# Patient Record
Sex: MALE | Race: WHITE | Employment: OTHER | URBAN - METROPOLITAN AREA
[De-identification: names, ages, dates, MRNs, and addresses within clinical notes are randomized per-mention and may not be internally consistent; named-entity substitution may affect disease eponyms.]

---

## 2017-02-01 ENCOUNTER — THERAPY VISIT (OUTPATIENT)
Dept: PHYSICAL THERAPY | Facility: CLINIC | Age: 62
End: 2017-02-01
Payer: COMMERCIAL

## 2017-02-01 DIAGNOSIS — Z47.89 AFTERCARE FOLLOWING SURGERY OF THE MUSCULOSKELETAL SYSTEM: ICD-10-CM

## 2017-02-01 DIAGNOSIS — M79.671 RIGHT FOOT PAIN: Primary | ICD-10-CM

## 2017-02-01 PROCEDURE — 97161 PT EVAL LOW COMPLEX 20 MIN: CPT | Mod: GP | Performed by: PHYSICAL THERAPIST

## 2017-02-01 PROCEDURE — 97110 THERAPEUTIC EXERCISES: CPT | Mod: GP | Performed by: PHYSICAL THERAPIST

## 2017-02-01 NOTE — PROGRESS NOTES
[facility] Initial Evaluation    Subjective:    Charan Pretty is a 61 year old male with a right foot condition.  Occurance: degeneration.  Condition occurred: for unknown reasons.  This is a new condition  Charan reports that he had a long history of plantar fasciitis and had undergone perforation surgery which did not resolve pain.  Had MRI which revealed frayed tendon to FHL and excessive calcaneal tuberosity.  Pt underwent surgery about 2 months ago for Taylor's excision and FHL transfer.  At this time, he is wearing a CAM boot and is supposed to be NWB until last week, otherwise ambulating with scooter.  Has taken a few steps at home without scooter but this increases pain in back of heel.  Pt works as a  at [employer], currently not working d/t present treatment problem.  .    Site of Pain: back of heel, bottom of heel.  Radiates to:  No radiation.  Pain is described as sharp and is intermittent and reported as 7/10.  Associated symptoms:  Tingling, numbness, loss of motion/stiffness, edema and loss of strength. Worse during: activity-dependent.  Symptoms are exacerbated by activity, ascending stairs, descending stairs, bending/squatting, standing, walking, certain positions and weight bearing and relieved by rest.  Since onset symptoms are unchanged.        General health as reported by patient is fair.  Pertinent medical history includes:  History of fractures, overweight and high blood pressure.  Medical allergies: no.  Other surgeries include:  None reported.  Current medications:  High blood pressure medication.  Current occupation is .  Patient is currently not working due to present treatment problem.  Primary job tasks include:  Prolonged standing and lifting (walking, various physical tasks, pushing/pulling).    Barriers include:  Stairs.    Red flags:  None as reported by patient.                      Objective:      Gait:  Pt using roll-about knee  scooter  Gait Type:  Not assessed         Flexibility/Screens:       Lower Extremity:      Decreased right lower extremity flexibility:  Gastroc and Soleus          Ankle/Foot Evaluation  ROM:  Arom ankle eval: strength not assessed d/t post-op status, tendon transfers and lack of restrictions specified in order.  AROM:    Dorsiflexion:  Left:   6  Right:   6  Plantarflexion:  Left:  60    Right:  34  Inversion:  Left:  30     Right:  18  Eversion:  12     Right:  2          LIGAMENT TESTING: not assessed              SPECIAL TESTS: not assessed    PALPATION:     Right ankle tenderness present at:   incisional  EDEMA:   Left ankle edema present at: 58.0 cm  Right ankle edema present at:  general and 62.0 cm      Figure 8 left: 58.0 cmFigure 8 right: 62.0 cm  MOBILITY TESTING:     Tib-Fib Distal Right: hypomobile  Talocrural Right: hypomobile  Subtalar Right: hypomobile  Midtarsal Right: hypomobile  First Ray Right: hypomobile  FUNCTIONAL TESTS: not assessed                                                                General     ROS    Assessment/Plan:      Patient is a 61 year old male with right foot complaints.    Patient has the following significant findings with corresponding treatment plan.                Diagnosis 1:  R foot pain, gait abnormality s/p Taylor's excision, FHL tendon transfer  Pain -  hot/cold therapy, manual therapy, splint/taping/bracing/orthotics, self management, education, directional preference exercise and home program  Decreased ROM/flexibility - manual therapy, therapeutic exercise and home program  Decreased joint mobility - manual therapy, therapeutic exercise and home program  Decreased strength - therapeutic exercise, therapeutic activities and home program  Impaired balance - neuro re-education, therapeutic activities and home program  Decreased proprioception - neuro re-education, therapeutic activities and home program  Inflammation - cold therapy and self management/home  program  Edema - cold therapy and self management/home program  Impaired gait - gait training and home program  Impaired muscle performance - neuro re-education and home program  Decreased function - therapeutic activities and home program    Therapy Evaluation Codes:   1) History comprised of:   Personal factors that impact the plan of care:      Profession.    Comorbidity factors that impact the plan of care are:      None.     Medications impacting care: None.  2) Examination of Body Systems comprised of:   Body structures and functions that impact the plan of care:      Foot.   Activity limitations that impact the plan of care are:      Squatting/kneeling, Stairs and Walking.  3) Clinical presentation characteristics are:   Stable/Uncomplicated.  4) Decision-Making    Low complexity using standardized patient assessment instrument and/or measureable assessment of functional outcome.  Cumulative Therapy Evaluation is: Low complexity.    Previous and current functional limitations:  (See Goal Flow Sheet for this information)    Short term and Long term goals: (See Goal Flow Sheet for this information)     Communication ability:  Patient appears to be able to clearly communicate and understand verbal and written communication and follow directions correctly.  Treatment Explanation - The following has been discussed with the patient:   RX ordered/plan of care  Anticipated outcomes  Possible risks and side effects  This patient would benefit from PT intervention to resume normal activities.   Rehab potential is good.    Frequency:  1-2 X week, once daily  Duration:  for 12 visits  Discharge Plan:  Achieve all LTG.  Independent in home treatment program.  Reach maximal therapeutic benefit.    Please refer to the daily flowsheet for treatment today, total treatment time and time spent performing 1:1 timed codes.

## 2017-02-15 ENCOUNTER — THERAPY VISIT (OUTPATIENT)
Dept: PHYSICAL THERAPY | Facility: CLINIC | Age: 62
End: 2017-02-15
Payer: COMMERCIAL

## 2017-02-15 DIAGNOSIS — Z47.89 AFTERCARE FOLLOWING SURGERY OF THE MUSCULOSKELETAL SYSTEM: ICD-10-CM

## 2017-02-15 DIAGNOSIS — M79.671 RIGHT FOOT PAIN: ICD-10-CM

## 2017-02-15 PROCEDURE — 97110 THERAPEUTIC EXERCISES: CPT | Mod: GP | Performed by: PHYSICAL THERAPIST

## 2017-02-15 PROCEDURE — 97140 MANUAL THERAPY 1/> REGIONS: CPT | Mod: GP | Performed by: PHYSICAL THERAPIST

## 2017-02-22 ENCOUNTER — THERAPY VISIT (OUTPATIENT)
Dept: PHYSICAL THERAPY | Facility: CLINIC | Age: 62
End: 2017-02-22
Payer: COMMERCIAL

## 2017-02-22 DIAGNOSIS — M79.671 RIGHT FOOT PAIN: ICD-10-CM

## 2017-02-22 DIAGNOSIS — Z47.89 AFTERCARE FOLLOWING SURGERY OF THE MUSCULOSKELETAL SYSTEM: ICD-10-CM

## 2017-02-22 PROCEDURE — 97110 THERAPEUTIC EXERCISES: CPT | Mod: GP | Performed by: PHYSICAL THERAPIST

## 2017-02-22 PROCEDURE — 97140 MANUAL THERAPY 1/> REGIONS: CPT | Mod: GP | Performed by: PHYSICAL THERAPIST

## 2017-03-01 ENCOUNTER — THERAPY VISIT (OUTPATIENT)
Dept: PHYSICAL THERAPY | Facility: CLINIC | Age: 62
End: 2017-03-01
Payer: COMMERCIAL

## 2017-03-01 DIAGNOSIS — Z47.89 AFTERCARE FOLLOWING SURGERY OF THE MUSCULOSKELETAL SYSTEM: ICD-10-CM

## 2017-03-01 DIAGNOSIS — M79.671 RIGHT FOOT PAIN: ICD-10-CM

## 2017-03-01 PROCEDURE — 97110 THERAPEUTIC EXERCISES: CPT | Mod: GP | Performed by: PHYSICAL THERAPIST

## 2017-03-01 PROCEDURE — 97140 MANUAL THERAPY 1/> REGIONS: CPT | Mod: GP | Performed by: PHYSICAL THERAPIST

## 2017-03-01 NOTE — PROGRESS NOTES
Subjective:    HPI                    Objective:    System    Physical Exam    General     ROS    Assessment/Plan:      SUBJECTIVE  Subjective changes as noted by pt:  My foot is feeling about the same but I am walking farther distances. I have worn the boot very little in the past week. HEP is going ok.     Current pain level:  Current Pain level: 3/10   Changes in function:  Yes (See Goal flowsheet attached for changes in current functional level)     Adverse reaction to treatment or activity:  None    OBJECTIVE  Changes in objective findings:  Fair/good flexibility gastroc, antalgic gait with deviations- toe out position, decreased WB on R, short stride length, decreased heel strike and push off. Reviewed toe/heel raises in sitting- progress to ankle boards, knee bends next visit.     ASSESSMENT  Charan continues to require intervention to meet STG and LTG's: PT  Patient is progressing as expected.  Patient is becoming more independent in home exercise program  Response to therapy has shown an improvement in  pain level, flexibility and gait  Progress made towards STG/LTG?  Yes (See Goal flowsheet attached for updates on achievement of STG and LTG)    PLAN  Continue current treatment plan until patient demonstrates readiness to progress to higher level exercises.    PTA/ATC plan:  N/A    Please refer to the daily flowsheet for treatment today, total treatment time and time spent performing 1:1 timed codes.

## 2017-03-01 NOTE — MR AVS SNAPSHOT
After Visit Summary   3/1/2017    Charan Pretty    MRN: [id number]           Patient Information     Date Of Birth          1955        Visit Information        Provider Department      3/1/2017 12:40 PM Yesi Akhtar, PT [facility]        Today's Diagnoses     Right foot pain        Aftercare following surgery of the musculoskeletal system           Follow-ups after your visit        Your next 10 appointments already scheduled     Mar 08, 2017 10:10 AM CST   JESSICA Extremity with Yesi Akhtar PT   [facility] ([facility])    [address]  [facility] [address]   [phone]            Mar 15, 2017 10:10 AM CDT   [facility] Extremity with Kat Dickerson PT   [facility] ([facility])    [address]  [facility] [id number] [phone]              Who to contact     If you have questions or need follow up information about today's clinic visit or your schedule please contact [facility] directly at [phone].  Normal or non-critical lab and imaging results will be communicated to you by MyChart, letter or phone within 4 business days after the clinic has received the results. If you do not hear from us within 7 days, please contact the clinic through MyChart or phone. If you have a critical or abnormal lab result, we will notify you by phone as soon as possible.  Submit refill requests through [employer]M [employer] Course or call your pharmacy and they will forward the refill request to us. Please allow 3 business days for your refill to be completed.          Additional Information About Your Visit        MyChart Information     FRM Study Course lets you send messages to your doctor, view your test results, renew your prescriptions, schedule appointments and more. To sign up, go to  [email or web address] . Click on \"Log in\" on the left side of the screen, which will take you to the Welcome page. Then click on \"Sign up Now\" on the right side of the page.     You will be asked to enter the access code listed below, as well as some personal information. Please follow the directions to create your username and password.     Your access code is: [email or web address]  Expires: 2017 12:57 PM     Your access code will  in 90 days. If you need help or a new code, please call your [place] clinic or [phone].        Care EveryWhere ID     This is your Care EveryWhere ID. This could be used by other organizations to access your [place] medical records  [id number]         Blood Pressure from Last 3 Encounters:   13 119/82   13 123/68    Weight from Last 3 Encounters:   12 102.1 kg (225 lb)              We Performed the Following     Manual Ther Tech, 1+Regions, EA 15 min     Therapeutic Exercises        Primary Care Provider Fax #    [facility] [phone] [address]  [facility] [address]        Thank you!     Thank you for choosing [facility]  for your care. Our goal is always to provide you with excellent care. Hearing back from our patients is one way we can continue to improve our services. Please take a few minutes to complete the written survey that you may receive in the mail after your visit with us. Thank you!             Your Updated Medication List - Protect others around you: Learn how to safely use, store and throw away your medicines at www.disposemymeds.org.          This list is accurate as of: 3/1/17  4:15 PM.  Always use your most recent med list.                   Brand Name Dispense Instructions for use    TOPROL XL PO      Take 50 mg by mouth daily.         "

## 2017-03-08 ENCOUNTER — THERAPY VISIT (OUTPATIENT)
Dept: PHYSICAL THERAPY | Facility: CLINIC | Age: 62
End: 2017-03-08
Payer: COMMERCIAL

## 2017-03-08 DIAGNOSIS — M79.671 RIGHT FOOT PAIN: ICD-10-CM

## 2017-03-08 DIAGNOSIS — Z47.89 AFTERCARE FOLLOWING SURGERY OF THE MUSCULOSKELETAL SYSTEM: ICD-10-CM

## 2017-03-08 PROCEDURE — 97110 THERAPEUTIC EXERCISES: CPT | Mod: GP | Performed by: PHYSICAL THERAPIST

## 2017-03-08 PROCEDURE — 97140 MANUAL THERAPY 1/> REGIONS: CPT | Mod: GP | Performed by: PHYSICAL THERAPIST

## 2017-03-08 NOTE — PROGRESS NOTES
Subjective:    HPI                    Objective:    System    Physical Exam    General     ROS    Assessment/Plan:      SUBJECTIVE  Subjective changes as noted by pt:  I'm not using the boot now, able to walk up to 30' however my foot does get sore. I'm able to walk 5-10' with minimal to no pain. I began feeling heel soreness this week, similar to the plantar fascia pain in the past. I'm thinking I may need new orthotics. HEP is going well- no concerns.     Current pain level: 2/10     Changes in function:  Yes (See Goal flowsheet attached for changes in current functional level)     Adverse reaction to treatment or activity:  None    OBJECTIVE  Changes in objective findings:  Yes, improved gait with weight shift to the R LE, heel strike and push off, and increased jose a. Added round ankle saucer( CW, CCW) in sitting and knee bends- tolerated well. Increased scar mobility/minimal point tenderness with improved gait/decreased pain following manual STM/mobs.        ASSESSMENT  Charan continues to require intervention to meet STG and LTG's: PT  Patient's symptoms are resolving.  Patient is progressing as expected.  Patient is becoming more independent in home exercise program  Response to therapy has shown an improvement in  pain level, flexibility, gait and function  Progress made towards STG/LTG?  Yes (See Goal flowsheet attached for updates on achievement of STG and LTG)    PLAN  Current treatment program is being advanced to more complex exercises.  The following procedures have been added:  neuromuscular re-education and therapeutic activities    PTA/ATC plan:  N/A    Please refer to the daily flowsheet for treatment today, total treatment time and time spent performing 1:1 timed codes.

## 2017-03-08 NOTE — MR AVS SNAPSHOT
"              After Visit Summary   3/8/2017    Charan Pretty    MRN: [id number]           Patient Information     Date Of Birth          1955        Visit Information        Provider Department      3/8/2017 10:10 AM Yesi [facility]htar, PT [facility]        Today's Diagnoses     Right foot pain        Aftercare following surgery of the musculoskeletal system           Follow-ups after your visit        Your next 10 appointments already scheduled     Mar 15, 2017 10:10 AM CDT   JESSICA Extremity with Kat Dickerson PT   [facility] ([facility])    [address]  [facility] [id number] [phone]              Who to contact     If you have questions or need follow up information about today's clinic visit or your schedule please contact [facility] directly at [phone].  Normal or non-critical lab and imaging results will be communicated to you by MyChart, letter or phone within 4 business days after the clinic has received the results. If you do not hear from us within 7 days, please contact the clinic through MyChart or phone. If you have a critical or abnormal lab result, we will notify you by phone as soon as possible.  Submit refill requests through [email or web address] or call your pharmacy and they will forward the refill request to us. Please allow 3 business days for your refill to be completed.          Additional Information About Your Visit        Lixt[employer] Software[employer] Information     [email or web address] lets you send messages to your doctor, view your test results, renew your prescriptions, schedule appointments and more. To sign up, go to [email or web address] . Click on \"Log in\" on the left side of the screen, which will take you to the Welcome page. Then click on \"Sign up Now\" on the right side of the page.     You will be asked to enter the access code listed " below, as well as some personal information. Please follow the directions to create your username and password.     Your access code is: [email or web address]  Expires: 2017 12:57 PM     Your access code will  in 90 days. If you need help or a new code, please call your [place] clinic or [phone].        Care EveryWhere ID     This is your Care EveryWhere ID. This could be used by other organizations to access your [place] medical records  [id number]         Blood Pressure from Last 3 Encounters:   13 119/82   13 123/68    Weight from Last 3 Encounters:   12 102.1 kg (225 lb)              We Performed the Following     Manual Ther Tech, 1+Regions, EA 15 min     Therapeutic Exercises        Primary Care Provider Fax #    [facility] [phone] [address]  [facility] [address]        Thank you!     Thank you for choosing [facility]  for your care. Our goal is always to provide you with excellent care. Hearing back from our patients is one way we can continue to improve our services. Please take a few minutes to complete the written survey that you may receive in the mail after your visit with us. Thank you!             Your Updated Medication List - Protect others around you: Learn how to safely use, store and throw away your medicines at www.disposemymeds.org.          This list is accurate as of: 3/8/17 12:17 PM.  Always use your most recent med list.                   Brand Name Dispense Instructions for use    TOPROL XL PO      Take 50 mg by mouth daily.

## 2017-03-15 ENCOUNTER — THERAPY VISIT (OUTPATIENT)
Dept: PHYSICAL THERAPY | Facility: CLINIC | Age: 62
End: 2017-03-15
Payer: COMMERCIAL

## 2017-03-15 DIAGNOSIS — M79.671 RIGHT FOOT PAIN: ICD-10-CM

## 2017-03-15 DIAGNOSIS — Z47.89 AFTERCARE FOLLOWING SURGERY OF THE MUSCULOSKELETAL SYSTEM: ICD-10-CM

## 2017-03-15 PROCEDURE — 97140 MANUAL THERAPY 1/> REGIONS: CPT | Mod: GP | Performed by: PHYSICAL THERAPIST

## 2017-03-15 PROCEDURE — 97110 THERAPEUTIC EXERCISES: CPT | Mod: GP | Performed by: PHYSICAL THERAPIST

## 2017-03-29 ENCOUNTER — THERAPY VISIT (OUTPATIENT)
Dept: PHYSICAL THERAPY | Facility: CLINIC | Age: 62
End: 2017-03-29
Payer: COMMERCIAL

## 2017-03-29 DIAGNOSIS — M79.671 RIGHT FOOT PAIN: ICD-10-CM

## 2017-03-29 DIAGNOSIS — Z47.89 AFTERCARE FOLLOWING SURGERY OF THE MUSCULOSKELETAL SYSTEM: ICD-10-CM

## 2017-03-29 PROCEDURE — 97110 THERAPEUTIC EXERCISES: CPT | Mod: GP | Performed by: PHYSICAL THERAPIST

## 2017-03-29 PROCEDURE — 97140 MANUAL THERAPY 1/> REGIONS: CPT | Mod: GP | Performed by: PHYSICAL THERAPIST

## 2017-04-05 ENCOUNTER — THERAPY VISIT (OUTPATIENT)
Dept: PHYSICAL THERAPY | Facility: CLINIC | Age: 62
End: 2017-04-05
Payer: COMMERCIAL

## 2017-04-05 DIAGNOSIS — Z47.89 AFTERCARE FOLLOWING SURGERY OF THE MUSCULOSKELETAL SYSTEM: ICD-10-CM

## 2017-04-05 DIAGNOSIS — M79.671 RIGHT FOOT PAIN: ICD-10-CM

## 2017-04-05 PROCEDURE — 97140 MANUAL THERAPY 1/> REGIONS: CPT | Mod: GP | Performed by: PHYSICAL THERAPIST

## 2017-04-05 PROCEDURE — 97110 THERAPEUTIC EXERCISES: CPT | Mod: GP | Performed by: PHYSICAL THERAPIST

## 2017-04-12 ENCOUNTER — THERAPY VISIT (OUTPATIENT)
Dept: PHYSICAL THERAPY | Facility: CLINIC | Age: 62
End: 2017-04-12
Payer: COMMERCIAL

## 2017-04-12 DIAGNOSIS — Z47.89 AFTERCARE FOLLOWING SURGERY OF THE MUSCULOSKELETAL SYSTEM: ICD-10-CM

## 2017-04-12 DIAGNOSIS — M79.671 RIGHT FOOT PAIN: ICD-10-CM

## 2017-04-12 PROCEDURE — 97140 MANUAL THERAPY 1/> REGIONS: CPT | Mod: GP | Performed by: PHYSICAL THERAPIST

## 2017-04-12 PROCEDURE — 97530 THERAPEUTIC ACTIVITIES: CPT | Mod: GP | Performed by: PHYSICAL THERAPIST

## 2017-04-19 ENCOUNTER — THERAPY VISIT (OUTPATIENT)
Dept: PHYSICAL THERAPY | Facility: CLINIC | Age: 62
End: 2017-04-19
Payer: COMMERCIAL

## 2017-04-19 DIAGNOSIS — Z47.89 AFTERCARE FOLLOWING SURGERY OF THE MUSCULOSKELETAL SYSTEM: ICD-10-CM

## 2017-04-19 DIAGNOSIS — M79.671 RIGHT FOOT PAIN: ICD-10-CM

## 2017-04-19 PROCEDURE — 97110 THERAPEUTIC EXERCISES: CPT | Mod: GP | Performed by: PHYSICAL THERAPIST

## 2017-04-19 PROCEDURE — 97140 MANUAL THERAPY 1/> REGIONS: CPT | Mod: GP | Performed by: PHYSICAL THERAPIST

## 2017-05-03 ENCOUNTER — THERAPY VISIT (OUTPATIENT)
Dept: PHYSICAL THERAPY | Facility: CLINIC | Age: 62
End: 2017-05-03
Payer: COMMERCIAL

## 2017-05-03 DIAGNOSIS — Z47.89 AFTERCARE FOLLOWING SURGERY OF THE MUSCULOSKELETAL SYSTEM: ICD-10-CM

## 2017-05-03 DIAGNOSIS — M79.671 RIGHT FOOT PAIN: ICD-10-CM

## 2017-05-03 PROCEDURE — 97140 MANUAL THERAPY 1/> REGIONS: CPT | Mod: GP | Performed by: PHYSICAL THERAPIST

## 2017-05-03 PROCEDURE — 97110 THERAPEUTIC EXERCISES: CPT | Mod: GP | Performed by: PHYSICAL THERAPIST

## 2017-05-17 ENCOUNTER — THERAPY VISIT (OUTPATIENT)
Dept: PHYSICAL THERAPY | Facility: CLINIC | Age: 62
End: 2017-05-17
Payer: COMMERCIAL

## 2017-05-17 DIAGNOSIS — Z47.89 AFTERCARE FOLLOWING SURGERY OF THE MUSCULOSKELETAL SYSTEM: ICD-10-CM

## 2017-05-17 DIAGNOSIS — M79.671 RIGHT FOOT PAIN: ICD-10-CM

## 2017-05-17 PROCEDURE — 97140 MANUAL THERAPY 1/> REGIONS: CPT | Mod: GP | Performed by: PHYSICAL THERAPIST

## 2017-05-17 PROCEDURE — 97110 THERAPEUTIC EXERCISES: CPT | Mod: GP | Performed by: PHYSICAL THERAPIST

## 2017-05-17 NOTE — PROGRESS NOTES
Subjective:    HPI                    Objective:    System    Physical Exam    General     ROS    Assessment/Plan:      DISCHARGE REPORT    Progress reporting period is from 2/1/17 to 5/17/17.       SUBJECTIVE  Subjective: Charan reports that his foot is feeling better, less swelling, achilles is feeling good but continues to have pain in heel with walking and progresses as days goes on.  Back now feeling good- no issues.  Feels that he will be ready to progress independently after today, however mild concern about heel pain continues.  Continues to favor his R foot going down stairs d/t heel pain.  Heel feels good coming off weekend.      Current Pain level:  2/10 heel pain, 0/10 achilles pain.     Changes in function:  Yes (See Goal flowsheet attached for changes in current functional level)  Adverse reaction to treatment or activity: activity - increased heel pain with prolonged walking    OBJECTIVE  Changes noted in objective findings:  Yes, please see below.   Objective: R ankle AROM: DF 14, PF 40, Inv 32, Ev 28.  R ankle strength 5/5.  Tenderness to firm palpation of central plantar surface of calcaneus, nontender over plantar fascia and calcaneal insertion, nontender over achilles and incision.  Charan continues to ambulate with heel strike and lack of roll onto forefoot for pushoff.  He has been able to change this slightly with cuing but has natural outtoeing B.  Charan has been instructed to continue his achilles stretches and eccentric strengthening exercises.  At this time, he is ready to progress independently.       ASSESSMENT/PLAN  Updated problem list and treatment plan: Diagnosis 1:  s/p R Taylor's excision, FHL transfer  Pain -  home program  Decreased ROM/flexibility - home program  Inflammation - self management/home program  Impaired gait - home program  STG/LTGs have been met or progress has been made towards goals:  Yes (See Goal flow sheet completed today.)  Assessment of Progress: The patient has  met all of their long term goals.  Self Management Plans:  Patient has been instructed in a home treatment program.  Patient  has been instructed in self management of symptoms.  I have re-evaluated this patient and find that the nature, scope, duration and intensity of the therapy is appropriate for the medical condition of the patient.  Charan continues to require the following intervention to meet STG and LTG's:  PT intervention is no longer required to meet STG/LTG.    Recommendations:  This patient is ready to be discharged from therapy and continue their home treatment program.    Please refer to the daily flowsheet for treatment today, total treatment time and time spent performing 1:1 timed codes.

## 2017-05-17 NOTE — MR AVS SNAPSHOT
"              After Visit Summary   2017    Charan Pretty    MRN: [id number]           Patient Information     Date Of Birth          1955        Visit Information        Provider Department      2017 2:40 PM Kat Dickerson PT [facility]        Today's Diagnoses     Right foot pain        Aftercare following surgery of the musculoskeletal system           Follow-ups after your visit        Who to contact     If you have questions or need follow up information about today's clinic visit or your schedule please contact [facility] directly at [phone].  Normal or non-critical lab and imaging results will be communicated to you by [employer], letter or phone within 4 business days after the clinic has received the results. If you do not hear from us within 7 days, please contact the clinic through [employer] or phone. If you have a critical or abnormal lab result, we will notify you by phone as soon as possible.  Submit refill requests through [employer]tes or call your pharmacy and they will forward the refill request to us. Please allow 3 business days for your refill to be completed.          Additional Information About Your Visit        MyChart Information     [employer] lets you send messages to your doctor, view your test results, renew your prescriptions, schedule appointments and more. To sign up, go to [email or web address] . Click on \"Log in\" on the left side of the screen, which will take you to the Welcome page. Then click on \"Sign up Now\" on the right side of the page.     You will be asked to enter the access code listed below, as well as some personal information. Please follow the directions to create your username and password.     Your access code is: [email or web address]  Expires: 8/15/2017  3:24 PM     Your access code will  in 90 days. If you need help or a new code, please call your " [facility] or [phone].        Care EveryWhere ID     This is your Care EveryWhere ID. This could be used by other organizations to access your [place] medical records  [id number]         Blood Pressure from Last 3 Encounters:   01/16/13 119/82   01/02/13 123/68    Weight from Last 3 Encounters:   12/28/12 102.1 kg (225 lb)              We Performed the Following     JESSICA PROGRESS NOTES REPORT     MANUAL THER TECH,1+REGIONS,EA 15 MIN     THERAPEUTIC EXERCISES        Primary Care Provider Fax #    [facility] [phone] [address]  [facility] [address]        Thank you!     Thank you for choosing [facility]  for your care. Our goal is always to provide you with excellent care. Hearing back from our patients is one way we can continue to improve our services. Please take a few minutes to complete the written survey that you may receive in the mail after your visit with us. Thank you!             Your Updated Medication List - Protect others around you: Learn how to safely use, store and throw away your medicines at www.disposemymeds.org.          This list is accurate as of: 5/17/17  3:24 PM.  Always use your most recent med list.                   Brand Name Dispense Instructions for use    TOPROL XL PO      Take 50 mg by mouth daily.

## 2018-02-12 ENCOUNTER — TRANSFERRED RECORDS (OUTPATIENT)
Dept: HEALTH INFORMATION MANAGEMENT | Facility: CLINIC | Age: 63
End: 2018-02-12

## 2019-04-30 ENCOUNTER — TRANSFERRED RECORDS (OUTPATIENT)
Dept: HEALTH INFORMATION MANAGEMENT | Facility: CLINIC | Age: 64
End: 2019-04-30

## 2019-06-08 ENCOUNTER — TRANSFERRED RECORDS (OUTPATIENT)
Dept: HEALTH INFORMATION MANAGEMENT | Facility: CLINIC | Age: 64
End: 2019-06-08

## 2019-06-14 ENCOUNTER — TRANSFERRED RECORDS (OUTPATIENT)
Dept: HEALTH INFORMATION MANAGEMENT | Facility: CLINIC | Age: 64
End: 2019-06-14

## 2019-08-16 ENCOUNTER — TRANSFERRED RECORDS (OUTPATIENT)
Dept: HEALTH INFORMATION MANAGEMENT | Facility: CLINIC | Age: 64
End: 2019-08-16

## 2019-08-19 ENCOUNTER — TRANSFERRED RECORDS (OUTPATIENT)
Dept: HEALTH INFORMATION MANAGEMENT | Facility: CLINIC | Age: 64
End: 2019-08-19

## 2019-08-28 ENCOUNTER — TRANSFERRED RECORDS (OUTPATIENT)
Dept: HEALTH INFORMATION MANAGEMENT | Facility: CLINIC | Age: 64
End: 2019-08-28

## 2019-08-30 ENCOUNTER — TRANSFERRED RECORDS (OUTPATIENT)
Dept: HEALTH INFORMATION MANAGEMENT | Facility: CLINIC | Age: 64
End: 2019-08-30

## 2019-08-30 LAB — EJECTION FRACTION: 63

## 2019-09-03 ENCOUNTER — TRANSFERRED RECORDS (OUTPATIENT)
Dept: HEALTH INFORMATION MANAGEMENT | Facility: CLINIC | Age: 64
End: 2019-09-03

## 2019-09-08 ENCOUNTER — TRANSFERRED RECORDS (OUTPATIENT)
Dept: HEALTH INFORMATION MANAGEMENT | Facility: CLINIC | Age: 64
End: 2019-09-08

## 2019-09-12 ENCOUNTER — TRANSFERRED RECORDS (OUTPATIENT)
Dept: HEALTH INFORMATION MANAGEMENT | Facility: CLINIC | Age: 64
End: 2019-09-12

## 2019-09-12 LAB
ALT SERPL-CCNC: 55 IU/L (ref 12–58)
AST SERPL-CCNC: 68 IU/L (ref 15–37)
CREAT SERPL-MCNC: 1.3 MG/DL (ref 0.7–1.3)
GFR SERPL CREATININE-BSD FRML MDRD: 56 ML/MIN
GLUCOSE SERPL-MCNC: 126 MG/DL (ref 74–106)
INR PPP: 1.2 (ref 0.8–1.3)
POTASSIUM SERPL-SCNC: 4.2 MMOL/L (ref 3.5–5.1)

## 2019-09-16 ENCOUNTER — TRANSFERRED RECORDS (OUTPATIENT)
Dept: HEALTH INFORMATION MANAGEMENT | Facility: CLINIC | Age: 64
End: 2019-09-16

## 2019-09-16 NOTE — TELEPHONE ENCOUNTER
FUTURE VISIT INFORMATION      FUTURE VISIT INFORMATION:    Date: 10/10/2019     Time: 9:00 AM    Location: [facility]  REFERRAL INFORMATION:    Referring provider:  Self-Referral    Referring providers clinic:  N/A    Reason for visit/diagnosis  Persistent Cough     RECORDS REQUESTED FROM:       Clinic name Comments Records Status Imaging Status   [facility] Upper GI Endoscopy: 9/3/19, 19, 19  PFT: 8/15/19, 19, 18, 18  XR Chest: 19, 19, 19, 3/28/18, 18, 17  CT Chest: 19  ED visit with Dr. Nyla Moeller: 19 Care Everywhere PACS- Archived on 19   [facility] [place]   8/15/19 Office visit with Dr. Arvind Johnson Care Everywhere    [facility]  19 Office visit with Dr. Jose Dhaliwal Care Everywhere                            Action 19, 4:20pm    Action Taken Called Pt and was unable to get a hold of Pt; called to inquire if Pt has been for this DX else where. Will try again 19 - Bhao     Action 19, 10:46am   Action Taken Spoke with Pt and Pt noted that he was seen at [facility] with a lung specialist and also his primary care provider for the cough. - Bhao       Action 19, 11:22am    Action Taken Sent fax request to [facility]:  - Push images into PACS: Chest Xrays and CT Chest (Radiology fax: [phone])  - Office notes and records for PFT done on 19 (Fax: [phone])     Action 19    Action Taken Received XR Chest through PACS  - Received: 17, 18, 3/28/18, 19  - Still missin19, 19 and CT Chest 19     Action 19 4:09 pm    Action Taken Send 2nd fax request to [facility] ([phone]) for images:  - XR Chest 19 and 19  - CT Chest 19     Action 19 5:04pm   Action Taken Checked PACS and images were pushed; archived into PACS. Gary Carranza

## 2019-09-18 ENCOUNTER — DOCUMENTATION ONLY (OUTPATIENT)
Dept: CARE COORDINATION | Facility: CLINIC | Age: 64
End: 2019-09-18

## 2019-10-02 ENCOUNTER — TRANSFERRED RECORDS (OUTPATIENT)
Dept: HEALTH INFORMATION MANAGEMENT | Facility: CLINIC | Age: 64
End: 2019-10-02

## 2019-10-04 ENCOUNTER — MEDICAL CORRESPONDENCE (OUTPATIENT)
Dept: HEALTH INFORMATION MANAGEMENT | Facility: CLINIC | Age: 64
End: 2019-10-04

## 2019-10-09 ENCOUNTER — REFERRAL (OUTPATIENT)
Dept: TRANSPLANT | Facility: CLINIC | Age: 64
End: 2019-10-09

## 2019-10-09 VITALS — HEIGHT: 66 IN | BODY MASS INDEX: 36.64 KG/M2 | WEIGHT: 228 LBS

## 2019-10-09 DIAGNOSIS — R18.8 CIRRHOSIS OF LIVER WITH ASCITES, UNSPECIFIED HEPATIC CIRRHOSIS TYPE (H): Primary | ICD-10-CM

## 2019-10-09 DIAGNOSIS — K75.81 NASH (NONALCOHOLIC STEATOHEPATITIS): ICD-10-CM

## 2019-10-09 DIAGNOSIS — K74.60 CIRRHOSIS OF LIVER WITH ASCITES, UNSPECIFIED HEPATIC CIRRHOSIS TYPE (H): Primary | ICD-10-CM

## 2019-10-09 DIAGNOSIS — K76.6 PORTAL HYPERTENSION (H): ICD-10-CM

## 2019-10-09 ASSESSMENT — MIFFLIN-ST. JEOR: SCORE: 1771.95

## 2019-10-09 NOTE — TELEPHONE ENCOUNTER
Patient was asked the following questions during liver intake call.     Referring Provider: Florian Mai NP  Referring Diagnosis: ETOH Cirrhosis of the Liver without Ascites   PCP: Alva Shafer PA-C    1)Do you know why you have liver disease: Yes             If Alcoholic Cirrhosis is present when was your last drink: June 2019             Have you ever been through treatment for alcohol: No  2) Presence of Ascites:  No Paracentesis: No  3) Presence of Hepatic Encephalopathy: No Medications: No  4) History of GI Bleeding: Yes  5) EGD: Yes at [facility]   6) Colonoscopy:Yes at [facility]   7) MELD Score: 14  8) Insurance information: [employer]      Policy parsons:Self       Subscriber/policy/ID number: [id number]      Group Number: [id number]    Referral intake process completed.  Patient is aware that after financial approval is received, medical records will be requested.   Patient confirmed for a callback from transplant coordinator on 10/16/2019.  Tentative evaluation date TBD.    Confirmed coordinator will discuss evaluation process in more detail at the time of their call.   Patient is aware of the need to arrange age appropriate cancer screening, vaccinations, and dental care.  Reminded patient to complete questionnaire, complete medical records release, and review packet prior to evaluation visit .  Assessed patient for special needs (ie--wheelchair, assistance, guardian, and ):  Oxygen    Patient instructed to call [phone] with questions.     CAROLYN Snyder, LPN   Solid Organ Transplant

## 2019-10-09 NOTE — LETTER
LIVER TRANSPLANT  EVALUATION SCHEDULE    Patient:   Charan Pretty  MR#:    [id number]  Coordinator:  Dave      [phone]  :     Kindra                 [phone]  [facility]    [facility]  Date(s):    October 28, 2019 & October 29, 2019 November 11, 2019 & January 6, 2020     This is your evaluation schedule, please follow dates and times.  You will   receive reminder phone calls for other tests, but please follow this schedule  only!  If you have any questions about dates and times, please call us on  number listed above.  Thank you, Transplant Services     *NO FOOD or DRINK AFTER 10:30 PM*  (You may only have small amounts of water)    Day/Date:    Monday, October 28, 2019  Time Location Activity   6:30 AM Imaging and Lab testing  (1st floor [facility] ) Blood tests    6:45 AM Imaging and Lab testing  ([facility] floor [facility] ) Liver Ultrasound with dopplers=NO FOOD or DRINK 8 HOURS BEFORE TEST!!!   7:40 AM Imaging and Lab testing  ([facility] floor [facility] ) EKG   8:00 AM Imaging and Lab testing  ([facility] floor [facility] ) Chest X-ray    8:30 AM [facility]   ([facility] floor [facility]) Arterial Blood Gas Draw x2   9:30 AM-11:30 AM Transplant Services  ([facility] floor [facility]) Pre-transplant class             Day/Date:    Tuesday, October 29, 2019  Time Location Activity   7:30 AM Imaging and Lab Testing  (1st floor [facility] ) Dexa Scan HOLD Calcium pills & supplements 48 hours before test!   9:30 AM Transplant Services  ([facility] floor [facility]) Appointment with Sera Masters  Registered Dietitian   10:00 AM Transplant Services  ([facility] floor [facility]) Appointment with Ranulfo Licona,     11:00 AM Transplant Services  ([facility] floor [facility]) Appointment with Dr. Ma,  Transplant Surgeon    12:00 PM Medicine Specialties  (3rd floor [facility]) Appointment with Dr. Sanchez,  Hepatologist           Day/Date:    Monday, November 11, 2019  Time Location Activity   9:30 AM [facility]  (3rd floor [facility]) Appointment with Dr. Sorto,  Cardiology       Day/Date:    Monday, January 6, 2020  Time Location Activity   11:00 AM [facility]   (3rd floor [facility]) Appointment with Dr. Blackwood,  Pulmonary           Day/Date:    Every Thursday *OPTIONAL*  Time Location Activity   12:00 PM-1:30 PM Liver Transplant Support Group  Hospital Station 7[facility]  Room 7-120 Every Thursday *OPTIONAL*     *IF ON LINE CHECK IN IS NOT DONE, YOU WILL NEED TO CHECK IN 15 MINUTES EARLIER THEN THE FIRST SCHEDULED APPOINTMENT*

## 2019-10-09 NOTE — LETTER
10/14/2019    Charan Pretty  [address]  [place] [address]      Dear Charan,    Thank you for your interest in the Transplant Center at [facility], [facility]. We look forward to being a part of your care team and assisting you through the transplant process.    As we discussed, your transplant coordinator is Jacinto Khan Jr. (Liver).  You may call your coordinator at any time with questions or concerns call [phone].    Please complete the following.    1. Fill out and return the enclosed forms    Authorization for Electronic Communication    Authorization to Discuss Protected Health Information    Authorization for Release of Protected Health Information    2. Sign up for:    Local Dirtt, access to your electronic medical record (see enclosed pamphlet)    [email or web address], a transplant education website    You can use these tools to learn more about your transplant, communicate with your care team, and track your medical details      Sincerely,    Solid Organ Transplant  [facility], [facility]    cc: Referring Physician and PCP

## 2019-10-10 ENCOUNTER — OFFICE VISIT (OUTPATIENT)
Dept: OTOLARYNGOLOGY | Facility: CLINIC | Age: 64
End: 2019-10-10
Payer: COMMERCIAL

## 2019-10-10 ENCOUNTER — PRE VISIT (OUTPATIENT)
Dept: OTOLARYNGOLOGY | Facility: CLINIC | Age: 64
End: 2019-10-10

## 2019-10-10 VITALS — BODY MASS INDEX: 36.64 KG/M2 | SYSTOLIC BLOOD PRESSURE: 140 MMHG | DIASTOLIC BLOOD PRESSURE: 75 MMHG | WEIGHT: 227 LBS

## 2019-10-10 DIAGNOSIS — R05.9 COUGH: Primary | ICD-10-CM

## 2019-10-10 DIAGNOSIS — R49.0 DYSPHONIA: ICD-10-CM

## 2019-10-10 DIAGNOSIS — J38.7 IRRITABLE LARYNX SYNDROME: ICD-10-CM

## 2019-10-10 RX ORDER — PANTOPRAZOLE SODIUM 40 MG/1
40 TABLET, DELAYED RELEASE ORAL 2 TIMES DAILY
COMMUNITY
Start: 2019-09-09

## 2019-10-10 RX ORDER — POTASSIUM CHLORIDE 750 MG/1
10 TABLET, EXTENDED RELEASE ORAL DAILY
COMMUNITY
Start: 2019-09-09

## 2019-10-10 RX ORDER — FUROSEMIDE 40 MG
40 TABLET ORAL DAILY
COMMUNITY
Start: 2019-09-09

## 2019-10-10 RX ORDER — LEVOTHYROXINE SODIUM 25 UG/1
25 TABLET ORAL
COMMUNITY
Start: 2018-10-31

## 2019-10-10 ASSESSMENT — PAIN SCALES - GENERAL: PAINLEVEL: NO PAIN (0)

## 2019-10-10 NOTE — LETTER
10/10/2019       RE: Charan Pretty  [address]  [place] [address]     Dear Colleague,    Thank you for referring your patient, Charan Pretty, to the [facility] EAR NOSE AND THROAT at [facility]. Please see a copy of my visit note below.        [facility] Otol[facility]ology [facility]           Patient: Charan Pretty  MRN: [id number]    : 1955  Age/Gender: 64 year old male  Date of Service: October 10, 2019       Referring Provider   PCP: Alva Shafer  Referring Physician: Referred Self      Reason for Consultation   Chronic cough    History     HISTORY OF PRESENT ILLNESS: Mr. Pretty is a 64 year old male who presents to us today with chronic cough for the past year. It is worse with talking. He can only speak a sentence at a time. Today he comes with his twin sister who reports that the coughing was slightly better after intubation and diureses in the beginning of September. His cough had also gotten better after he stopped losartan but never completely improved. Sometimes his cough is worse with eating. He has liver cirrhosis an is being considered for liver transplant. He also has esophageal varices with bleeding episode this summer. He also has pulmonary disease with PFTs with a restrictive pattern on PFTs. He comes with a portable oxygen tank to clinic today.       PAST MEDICAL HISTORY:   Past Medical History:   Diagnosis Date     Coronary artery disease     Heart Murmor      Depressive disorder      Hypertension      Obesity          PAST SURGICAL HISTORY:   Past Surgical History:   Procedure Laterality Date     COLONOSCOPY       GI SURGERY      Upper Endo at [facility]      ORTHOPEDIC SURGERY      foot surgery,      PHACOEMULSIFICATION CLEAR CORNEA WITH TORIC INTRAOCULAR LENS IMPLANT  2013    Procedure: PHACOEMULSIFICATION CLEAR CORNEA WITH TORIC INTRAOCULAR LENS IMPLANT;  LEFT  PHACOEMULSIFICATION CLEAR CORNEA WITH TORIC INTRAOCULAR LENS IMPLANT ;  Surgeon: Florian Hummel MD;  Location: [facility]     PHACOEMULSIFICATION CLEAR CORNEA WITH TORIC INTRAOCULAR LENS IMPLANT  1/16/2013    Procedure: PHACOEMULSIFICATION CLEAR CORNEA WITH TORIC INTRAOCULAR LENS IMPLANT;  RIGHT PHACOEMULSIFICATION CLEAR CORNEA WITH TORIC INTRAOCULAR LENS IMPLANT ;  Surgeon: Florian Hummel MD;  Location: [facility]         CURRENT MEDICATIONS:   Current Outpatient Medications:      furosemide (LASIX) 40 MG tablet, Take 40 mg by mouth, Disp: , Rfl:      levothyroxine (SYNTHROID/LEVOTHROID) 25 MCG tablet, Take 25 mcg by mouth, Disp: , Rfl:      Metoprolol Succinate (TOPROL XL PO), Take 50 mg by mouth daily., Disp: , Rfl:      pantoprazole (PROTONIX) 40 MG EC tablet, Take 40 mg by mouth, Disp: , Rfl:      potassium chloride ER (K-DUR/KLOR-CON M) 10 MEQ CR tablet, Take 20 mEq by mouth, Disp: , Rfl:       ALLERGIES: Acetaminophen and Losartan      SOCIAL HISTORY:    Social History     Socioeconomic History     Marital status:      Spouse name: Not on file     Number of children: Not on file     Years of education: Not on file     Highest education level: Not on file   Occupational History     Not on file   Social Needs     Financial resource strain: Not on file     Food insecurity:     Worry: Not on file     Inability: Not on file     Transportation needs:     Medical: Not on file     Non-medical: Not on file   Tobacco Use     Smoking status: Former Smoker     Packs/day: 0.00     Smokeless tobacco: Never Used   Substance and Sexual Activity     Alcohol use: No     Comment: Sobriety, 1988     Drug use: Never     Sexual activity: Not on file   Lifestyle     Physical activity:     Days per week: Not on file     Minutes per session: Not on file     Stress: Not on file   Relationships     Social connections:     Talks on phone: Not on file     Gets together: Not on file     Attends Anabaptism service: Not on file     Active  member of club or organization: Not on file     Attends meetings of clubs or organizations: Not on file     Relationship status: Not on file     Intimate partner violence:     Fear of current or ex partner: Not on file     Emotionally abused: Not on file     Physically abused: Not on file     Forced sexual activity: Not on file   Other Topics Concern     Parent/sibling w/ CABG, MI or angioplasty before 65F 55M? Not Asked   Social History Narrative     Not on file           FAMILY HISTORY: Non-contributory for problems with anesthesia      REVIEW OF SYSTEMS:   The patient was asked a 14 point review of systems regarding constitutional symptoms, eye symptoms, ears, nose, mouth, throat symptoms, cardiovascular symptoms, respiratory symptoms, gastrointestinal symptoms, genitourinary symptoms, musculoskeletal symptoms, integumentary symptoms, neurological symptoms, psychiatric symptoms, endocrine symptoms, hematologic/lymphatic symptoms, and allergic/ immunologic symptoms.   The pertinent factors have been included in the HPI and below.    Physical Examination     The patient underwent a physical examination as described below. The pertinent positive and negative findings are summarized after the description of the examination.    Constitutional: The patient's developmental and nutritional status was assessed. The patient's voice quality was assessed.  Head and Face: The head and face were inspected for deformities. The sinuses were palpated. The salivary glands were palpated. Facial muscle strength was assessed bilaterally.  Eyes: Extraocular movements and primary gaze alignment were assessed.  Ears, Nose, Mouth and Throat: The ears and nose were examined for deformities. The nasal septum, mucosa, and turbinates were inspected by anterior rhinoscopy. The lips, teeth, and gums were examined for abnormalities. The oral mucosa, tongue, palate, tonsils, lateral and posterior pharynx were inspected for the presence of  asymmetry or mucosal lesions.    Neck: The tracheal position was noted, and the neck mass palpated to determine if there were any asymmetries, abnormal neck masses, thyromegally, or thyroid nodules.  Respiratory: The nature of the breathing and chest expansion/symmetry was observed.  Cardiovascular: The patient was examined to determine the presence of any edema or jugular venous distension.  Abdomen: The contour of the abdomen was noted.  Lymphatic: The patient was examined for infraclavicular lymphadenopathy.  Musculoskeletal: The patient was inspected for the presence of skeletal deformities.  Extremities: The extremities were examined for any clubbing or cyanosis.  Skin: The skin was examined for inflammatory or neoplastic conditions.  Neurologic: The patient's orientation, mood, and affect were noted. The cranial nerve  functions were examined.    Other pertinent positive and negative findings on physical examination:   each ear: EAC clear, TM intact, no effusion  Anterior rhinoscopy: no lesions  OC/OP: no lesions, uvula midline, soft palate elevates symmetrically, FOM/BOT soft  Neck: no lesions, no TH tenderness to palpation  With walker and oxygen tank    All other physical examination findings were within normal limits and noncontributory.    Procedures   Video Laryngoscopy with Stroboscopy (CPT 49610) and Behavioral & Qualitative Evaluation of Voice and Resonence     Preoperative Diagnosis:  Dysphonia and throat symptoms  Postoperative Diagnosis: Dysphonia and throat symptoms  Indication:  Patient has new or persistent dysphonia and throat symptoms.  This requires evaluation by stroboscopy to fully delineate the laryngeal functioning; especially mucosal wave assessment, ultrasharp visualization of lesions on the vocal folds, and overall functioning of the larynx.  Details of Procedure: A 70 degree rigid telescopic laryngoscope or a distal chip flexible scope was used. The lighting was obtained via a light  cable connected to a stroboscopic unit. The telescope was inserted either transorally or transnasally until the vocal folds could be visualized. The patient was instructed to sustain the vowel  ee  at a comfortable pitch and loudness as the voice was monitored by a microphone connected to a fundamental frequency tracker. This circuit tracked vocal periodicity, allowing the light to flash in synchrony with the glottal cycles. A setting on the stroboscope was set to change the phase of light strobing with relation to the vocal fundamental frequency, producing an image of 1 to 2 glottal cycles every second. The video images were recorded for analysis. Use of the variable speed, slow and stop scan allowed careful study of pertinent segments of laryngeal function to increase accuracy of clinical assessments of function and dysfunction.  In particular, features of glottal closure, mucosal wave on the vocal fold cover and laryngeal symmetry were analyzed. Lastly, the patient was asked to phonate speech samples and auditory/perceptual evaluation of voice and resonance were performed.  The vocal quality was carefully evaluated for hoarseness, breathiness, loudness, phrase length and intelligibility to determine the source of dysphonia.    Findings:   A. BEHAVIORAL & QUALITATIVE EVALUATION OF VOICE AND RESONENCE   Comments: F0 150 MPT: 3s  Vocal Quality: strained    Pitch Range:  Moderately reduced   Phrase Length:  Moderately reduced  Vocal Loudness: Mildly reduced  Dysarthria: No    B. LARYNGOVIDEOSTROBOSCOPY   Anatomic/Physiological Deviations:  LPR with postcricoid edema and vocal process ulceration, presbylarynx, severe supraglottic hyperfunction with running speec  Mucosal wave: Right:  No restriction     Left: No restriction  Bilateral Vocal Fold Vibration: Symmetric  Vocal Process: Right: No restriction    Left:  No restriction  Vocal Fold closure: {Complete glottal closure    Complication(s): None  Disposition:  Patient tolerated the procedure well            Supraglottic hyperfunction with speech      Fiberoptic Endoscopic Evaluation of Swallowing (CPT 61065)  and Interpretation of Swallowing (CPT 72067)    Indications: See above notes for complete history and physical. Patient complains of dysphagia to both solids and liquids and/or there is suggestion on history and endoscopic exam of the presence of dysphagia causing medical complaints.  Swallowing evaluation is being performed to assess the presence and degree of dysphagia, and to recommend a safe diet.     Pulmonary Status:  No PNA   Current Diet:              regular                                        thin liquids      Consistency Amounts:  Thin Liquid: 1 tsp   Puree: 1 tsp  Solid: cookies            Positioning: upright in a chair  Oral Peripheral Exam: See physical exam section.  Anatomic Notes: See Videostroboscopy report for assessment of anatomy and laryngeal functioning  Pharyngeal secretions prior to administration of liquid or food: No  Oral Phase Abnormal Findings: No abnormal behavior observed  Behavioral Adaptations: No abnormal behavior observed  Pharyngeal Phase Abnormal Findings: no penetration or aspiration, no coughing       Recommended Diet:  regular                                        thin liquids               Review of Relevant Clinical Data     Labs:  No results found for: TSH  No results found for: NA, CO2, BUN, CREAT, GLUCOSE, PHOS  No results found for: WBC, HGB, HCT, MCV, PLT  No results found for: CONY  No components found for: RHEUMATOIDFACTOR,  RF  No results found for: CRP  No components found for: CKTOT, URICACID  No components found for: C3, C4, DSDNAAB, NDNAABIFA  No results found for: MPOAB       Impression & Plan     IMPRESSION: Mr. Pretty is a 64 year old male who is being seen for chronic cough. Given medical comorbidities, pulmonary, cardiac and hepatic this increases deconditioning and poor breath support, however,  was able to evaluate and determine coughing with running speech during scope exam which revealed sever supraglottic hyperfunction. Referred for voice therapy. This will improve some of his symptoms but will not resolve his cough completely given his comorbidities.     RETURN VISIT: follow up in 3 months, or earlier as needed.     Thank you for the kind referral and for allowing me to share in the care of Mr. Pretty. If you have any questions, please do not hesitate to contact me.      Effie Marx MD    of Otolaryngology - Head and Neck Surgery   Voice, Airway, and Swallowing Disorders   [facility] at the [facility]    Clinics & Surgery [address] [address]  Phone: [phone]  Fax: [phone]    [place] [address]  Phone: [phone]  Fax: [phone]

## 2019-10-10 NOTE — NURSING NOTE
Chief Complaint   Patient presents with     Consult     Persistant cough     Weight 103 kg (227 lb).    Marleny Larios, EMT

## 2019-10-10 NOTE — PATIENT INSTRUCTIONS
You were seen in the ENT Clinic today by Dr. Blunt  If you have any questions or concerns after your appointment, please call   -  ENT Clinic: [phone] scheduling option 1 and nurse advice option 3.  -  Please follow up with speech therapy as recommended.  -  Follow up with  Dr. Blunt as needed after completion of speech therapy          Thank you for choosing [facility] and allowing us to be apart of your care team!  Dorene Rees LPN

## 2019-10-10 NOTE — LETTER
"10/10/2019       RE: Charan Pretty  [address]  [facility] [address]     Dear Colleague,    Thank you for referring your patient, Charan Pretty, to the [facility] VOICE at [facility]. Please see a copy of my visit note below.    [facility]  Asa Jones Jr., M.D., F.A.C.S.  Stacie Méndez M.D., M.P.H.  Fe Cherry, Ph.D., CCC/SLP  Rosa Maria Kirkland M.M. (voice), M.JANIS., CCC/SLP  Munir Rodriguez M.M. (voice), MANN., CCC/SLP    [facility]  VOICE/SPEECH/BREATHING EVALUATION AND LARYNGEAL EXAMINATION REPORT    Patient: Charan Pretty  Date of Visit: 10/10/2019    Clinician: Fe Cherry, Ph.D., CCC/SLP  Seen in conjunction with: Dr. Marx    CHIEF COMPLAINT:  Cough and voice problems    HISTORY  Charan Pretty is a 64 year old presenting today for evaluation of cough.    Please refer to Dr. Marx s dictation for a more complete history and impressions.   Salient details of his history are as follows:    Three year Hx coughing, worsening, but with some improvement with intubation on 9/10, during which \"a lot of fluid was drained\"    Cough comes on with talking and eating    Cough was also exacerbated by blood pressure medication; not currently taking    DIAGNOSIS/REASON FOR REFERRAL  Cough and Dysphonia/ Evaluate, perform laryngeal exam, treat as appropriate    CURRENT PATIENT COMPLAINTS    Primary: cough    Secondary: voice    Denies significant dyspnea, dysphagia, or pain    OTHER PERTINENT HISTORY    Complex medical Hx; please see Dr. Marx's note and elsewhere in this chart    OBJECTIVE FINDINGS  VOICE/ SPEECH/ NON-COMMUNICATIVE LARYNGEAL BEHAVIORS EVALUATION  An evaluation of the cough and voice was accomplished today; salient features are as follows:    Palpation of the laryngeal area shows:    No tenderness of the thyrohyoid area     Cough/ Throat clear:    Tight dry cough that comes on frequently with talking; sounds " consistent with upper airway cough    Breathing pattern:    Can be within normal limits and adequate, but sometimes shallow or guarded as he avoids cough     Inspirations are often inadequate for speech in volume     Phonation is often not coordinated with respiration    No overt tension is evident, except that there is some upper body tension when he tries to suppress cough    Voice quality is characterized by    Moderate backward focus    Tense/tight quality, increasing as cough comes on    Narrow resonance    Habitual pitch was not formally tested, but is judged to be lower than optimal for him    Loudness is mildly reduced due to the backward focus and thin, tight quality    Sustained vowels: start normal but become increasingly tight, ending with cough    CAPE-V Overall Severity:   21/100    LARYNGEAL EXAMINATION    Endoscopic laryngeal examination was performed by:  Effie Marx MD  I provided technical support, and provided the protocol of instructions for the patient.  Type of exam:   Flexible endoscopy with chip-tip technology and stroboscopy  This exam shows:    Laryngeal and Vocal Fold Mucosa    Essentially healthy laryngeal mucosa    Arytenoid edema and erythema, as well as erythema of the medial arytenoid walls     Posterior commissure hypertrophy    Moderate presence of pooled thickened secretions throughout the laryngeal area    Status of vocal fold mucosa:   o mildly dry and vascularized    Neurological and Functional Integrity of the Larynx    Vocal folds are mobile and meet at midline; movement is brisk and symmetric; exam is neurologically normal     Normal function is evident during a task of 20 quickly repeated vowels    On phonation, glottic closure is mildly weak at the midfold  o Spindle-shaped configuration of the glottis during many phonatory tasks    No cough throughout the initial portion of the exam     Supraglottic Function and Therapy Probes    Inconsistent moderate to severe four-way  constriction of the supraglottic larynx during connected speech    Therapy probes show   o Increased talking leads to coughing  o Constriction seems to result in cough as well as be a result of the cough; there can be a complete sphincter-like closing of the supraglottic space during attempts at phonation    Stroboscopy provided the following additional information:    The mucosa is stiff on the left, resulting in the midfold glottic gap    The mucosal wave is asymmetric, with chasing asymmetry     Dr. Marx and I reviewed this laryngeal exam with Mr. Pretty today, and I provided pertinent explanations, as well as written information:    Endoscopic findings are consistent with audio-perceptual assessment and patient Hx/complaints.    his symptoms are accounted for by obvious laryngeal irritation and muscular hyperfunction, both of which can be related to the cough     Because there appears to be a functional component to his symptoms, he would most likely benefit from functional speech therapy.    CLINICAL SWALLOW EVALUATION (CPT 03837)  Clinician Completing CSE: Fe Cherry, Ph. D,. CCC-SLP     ORAL MOTOR EXAMINATION:  Face: Normal/ Asymmetrical/ Masked/ Flat affect  Oral Musculature: generally intact  Structural Abnormalities: none present  Secretion Management: adequate/WNL  Mucosal Quality: adequate  Mandibular Strength and Mobility: intact  Oral Labial Strength and Mobility: WFL  Lingual Strength and Mobility: WFL  Velar Elevation: intact  Buccal Strength and Mobility: intact    FIBEROPTIC ENDOSCOPIC EVALUATION OF SWALLOWING (FEES)  Procedure: Flexible endoscopy with chip-tip technology without stroboscopy . Anesthetization spray was applied during laryngeal exam, but not reapplied during FEES.   Performed by: Dr. Marx  Indications for FEES:  See above notes for complete history. Patient complains of dysphagia and/or there is suggestion on history of the presence of dysphagia causing medical  complaints (eating leads to coughing). Therefore, Dr. Marx deemed it medically necessary to proceed with the FEES screening protocol. PO trials were evaluated under fiberoptic endoscopy completed by Dr. Marx.    I provided the PO trials, the protocol of instructions, and therapy probes for the patient. I also provided skilled evaluation of the swallow, and feedback/explanation to the patient.    Swallowing evaluation is being performed to assess the presence and degree of dysphagia, and to recommend a safe diet.    Pre-Swallow Secretions:    Location and Amount: moderate presence of pooled secretions in pyriform sinuses    Thin Liquid Trials:    Trial Number: 3, 4    Amount and Mode of Presentation: 1 tsp delivered by straw    Premature Spillage: not observed    Residual Location and Amount: not observed    Penetration/Aspiration: not observed    Therapy Probes and Results: none    Puree Texture Trails:     Trial Number: 1,2    Amount and Mode of Presentation: 2 spoonsful of applesauce    Premature Spillage: not observed    Residual Location and Amount: trace residue in valleculae    Penetration/Aspiration: not observed    Therapy Probes and Results: residual cleared easily on second swallow    Solid Texture Trials:    Trial Number:  5    Amount and Mode of Presentation: one bite of Geno Doone cookie    Premature Spillage: none observed    Residual Location and Amount: none observe    Penetration/Aspiration:none observed    Therapy Probes and Results: none    Diet Recommendations:regular diet with thin liquids    Recommended Feeding/Eating Techniques: no precautions needed    ASSESSMENT / PLAN  IMPRESSIONS:  This evaluation has resulted in the following diagnosis/diagnoses for Mr. Pretty  R05 (Chronic Cough)  R49.0 (Dysphonia)  J38.7 (Irritable Larynx Syndrome).      Laryngeal evaluation demonstrated mucosal irritation and muscular hyperfunction    Perceptual evaluation demonstrated frequent dry cough,  apparently triggered by talking; voice quality is tense-tight with poor airflow  Dysphonia/discomfort is accounted for by the imbalanced function of the intrinsic and extrinsic laryngeal musculature    FEES indicates some mild residual of puree texture, but this clears easily; cough with eating is more likely accounted for by overall laryngeal irritation than any deficit in the swallow  Cough/throat clear are accounted for by the hypersensitivity of the larynx and pharynx as evidenced by case history, patient complaints and absence of other organic findings; hypersensitivity is compounded by imbalance in the function of the intrinsic and extrinsic laryngeal musculature during connected speech  Because there appears to be a functional component to his symptoms, he would most likely benefit from a course of functional speech therapy  STIMULABILITY: results of therapy probes during perceptual and laryngeal evaluation demonstrate coughing is clearly related to phonation  RECOMMENDATIONS:     A course of speech therapy is recommended to optimize vocal technique, improve voice quality, promote reduced discomfort, effort and fatigue and help reduce chronic cough and mucosal irritation.    He demonstrates a Good prognosis for improvement given adherence to therapeutic recommendations. Therapeutic     Positive indicators: commitment to process; diagnosis is known to respond to functional treatment;     Negative indicators: none    TREATMENT PLAN  Speech therapy    DURATION/FREQUENCY OF TREATMENT  Six weekly or bi-weekly one-hour sessions, with four monthly one-hour follow-up sessions  A single session, accomplished today    GOALS  Patient goal:    To have a normal and acceptable voice quality  To reduce his cough to acceptable levels    Long-term goal(s): In 4 months, Mr. Pretty will:  1.  Report a week of typical vocal activities, in which dysphonia and cough during talking do not exceed a level of 2 out of 10, 80% of  the time   2.  Report a week with no more than two episodes of coughing or throat-clearing per day, that do not last more than two seconds  3.  In a 20-minute conversation task, demonstrate tense/tight quality and backward focus that do not exceed a level of 3 out of 10, 80% of the time, by therapist judgment    This treatment plan was developed with the patient who agreed with the recommendations.      TOTAL SERVICE TIME: 30 minutes  EVALUATION OF VOICE AND RESONANCE (84055)  CLINICAL SWALLOW EVALUATION (12933)  NO CHARGE FACILITY FEE (51341)      Fe Cherry, Ph.D., [facility]-SLP  Speech-Language Pathologist  Director, [facility]  [phone]              Again, thank you for allowing me to participate in the care of your patient.      Sincerely,    Fe Cherry, SLP

## 2019-10-11 ENCOUNTER — TRANSFERRED RECORDS (OUTPATIENT)
Dept: HEALTH INFORMATION MANAGEMENT | Facility: CLINIC | Age: 64
End: 2019-10-11

## 2019-10-11 ENCOUNTER — TELEPHONE (OUTPATIENT)
Dept: OTOLARYNGOLOGY | Facility: CLINIC | Age: 64
End: 2019-10-11

## 2019-10-11 NOTE — PROGRESS NOTES
[facility]           Patient: Charan Pretty  MRN: [id number]    : 1955  Age/Gender: 64 year old male  Date of Service: October 10, 2019       Referring Provider   PCP: Alva Shafer  Referring Physician: Referred Self      Reason for Consultation   Chronic cough    History     HISTORY OF PRESENT ILLNESS: Mr. Pretty is a 64 year old male who presents to us today with chronic cough for the past year. It is worse with talking. He can only speak a sentence at a time. Today he comes with his twin sister who reports that the coughing was slightly better after intubation and diureses in the beginning of September. His cough had also gotten better after he stopped losartan but never completely improved. Sometimes his cough is worse with eating. He has liver cirrhosis an is being considered for liver transplant. He also has esophageal varices with bleeding episode this summer. He also has pulmonary disease with PFTs with a restrictive pattern on PFTs. He comes with a portable oxygen tank to clinic today.       PAST MEDICAL HISTORY:   Past Medical History:   Diagnosis Date     Coronary artery disease     Heart Murmor      Depressive disorder      Hypertension      Obesity          PAST SURGICAL HISTORY:   Past Surgical History:   Procedure Laterality Date     COLONOSCOPY       GI SURGERY      Upper Endo at [facility]      ORTHOPEDIC SURGERY      foot surgery,      PHACOEMULSIFICATION CLEAR CORNEA WITH TORIC INTRAOCULAR LENS IMPLANT  2013    Procedure: PHACOEMULSIFICATION CLEAR CORNEA WITH TORIC INTRAOCULAR LENS IMPLANT;  LEFT PHACOEMULSIFICATION CLEAR CORNEA WITH TORIC INTRAOCULAR LENS IMPLANT ;  Surgeon: Florian Hummel MD;  Location: [facility]     PHACOEMULSIFICATION CLEAR CORNEA WITH TORIC INTRAOCULAR LENS IMPLANT  2013    Procedure: PHACOEMULSIFICATION CLEAR CORNEA WITH TORIC INTRAOCULAR LENS IMPLANT;  RIGHT  PHACOEMULSIFICATION CLEAR CORNEA WITH TORIC INTRAOCULAR LENS IMPLANT ;  Surgeon: Florian Hummel MD;  Location: [facility]         CURRENT MEDICATIONS:   Current Outpatient Medications:      furosemide (LASIX) 40 MG tablet, Take 40 mg by mouth, Disp: , Rfl:      levothyroxine (SYNTHROID/LEVOTHROID) 25 MCG tablet, Take 25 mcg by mouth, Disp: , Rfl:      Metoprolol Succinate (TOPROL XL PO), Take 50 mg by mouth daily., Disp: , Rfl:      pantoprazole (PROTONIX) 40 MG EC tablet, Take 40 mg by mouth, Disp: , Rfl:      potassium chloride ER (K-DUR/KLOR-CON M) 10 MEQ CR tablet, Take 20 mEq by mouth, Disp: , Rfl:       ALLERGIES: Acetaminophen and Losartan      SOCIAL HISTORY:    Social History     Socioeconomic History     Marital status:      Spouse name: Not on file     Number of children: Not on file     Years of education: Not on file     Highest education level: Not on file   Occupational History     Not on file   Social Needs     Financial resource strain: Not on file     Food insecurity:     Worry: Not on file     Inability: Not on file     Transportation needs:     Medical: Not on file     Non-medical: Not on file   Tobacco Use     Smoking status: Former Smoker     Packs/day: 0.00     Smokeless tobacco: Never Used   Substance and Sexual Activity     Alcohol use: No     Comment: Sobriety, 1988     Drug use: Never     Sexual activity: Not on file   Lifestyle     Physical activity:     Days per week: Not on file     Minutes per session: Not on file     Stress: Not on file   Relationships     Social connections:     Talks on phone: Not on file     Gets together: Not on file     Attends Christian service: Not on file     Active member of club or organization: Not on file     Attends meetings of clubs or organizations: Not on file     Relationship status: Not on file     Intimate partner violence:     Fear of current or ex partner: Not on file     Emotionally abused: Not on file     Physically abused: Not on file      Forced sexual activity: Not on file   Other Topics Concern     Parent/sibling w/ CABG, MI or angioplasty before 65F 55M? Not Asked   Social History Narrative     Not on file           FAMILY HISTORY: Non-contributory for problems with anesthesia      REVIEW OF SYSTEMS:   The patient was asked a 14 point review of systems regarding constitutional symptoms, eye symptoms, ears, nose, mouth, throat symptoms, cardiovascular symptoms, respiratory symptoms, gastrointestinal symptoms, genitourinary symptoms, musculoskeletal symptoms, integumentary symptoms, neurological symptoms, psychiatric symptoms, endocrine symptoms, hematologic/lymphatic symptoms, and allergic/ immunologic symptoms.   The pertinent factors have been included in the HPI and below.    Physical Examination     The patient underwent a physical examination as described below. The pertinent positive and negative findings are summarized after the description of the examination.    Constitutional: The patient's developmental and nutritional status was assessed. The patient's voice quality was assessed.  Head and Face: The head and face were inspected for deformities. The sinuses were palpated. The salivary glands were palpated. Facial muscle strength was assessed bilaterally.  Eyes: Extraocular movements and primary gaze alignment were assessed.  Ears, Nose, Mouth and Throat: The ears and nose were examined for deformities. The nasal septum, mucosa, and turbinates were inspected by anterior rhinoscopy. The lips, teeth, and gums were examined for abnormalities. The oral mucosa, tongue, palate, tonsils, lateral and posterior pharynx were inspected for the presence of asymmetry or mucosal lesions.    Neck: The tracheal position was noted, and the neck mass palpated to determine if there were any asymmetries, abnormal neck masses, thyromegally, or thyroid nodules.  Respiratory: The nature of the breathing and chest expansion/symmetry was observed.  Cardiovascular:  The patient was examined to determine the presence of any edema or jugular venous distension.  Abdomen: The contour of the abdomen was noted.  Lymphatic: The patient was examined for infraclavicular lymphadenopathy.  Musculoskeletal: The patient was inspected for the presence of skeletal deformities.  Extremities: The extremities were examined for any clubbing or cyanosis.  Skin: The skin was examined for inflammatory or neoplastic conditions.  Neurologic: The patient's orientation, mood, and affect were noted. The cranial nerve  functions were examined.    Other pertinent positive and negative findings on physical examination:   each ear: EAC clear, TM intact, no effusion  Anterior rhinoscopy: no lesions  OC/OP: no lesions, uvula midline, soft palate elevates symmetrically, FOM/BOT soft  Neck: no lesions, no TH tenderness to palpation  With walker and oxygen tank    All other physical examination findings were within normal limits and noncontributory.    Procedures   Video Laryngoscopy with Stroboscopy (CPT 53158) and Behavioral & Qualitative Evaluation of Voice and Resonence     Preoperative Diagnosis:  Dysphonia and throat symptoms  Postoperative Diagnosis: Dysphonia and throat symptoms  Indication:  Patient has new or persistent dysphonia and throat symptoms.  This requires evaluation by stroboscopy to fully delineate the laryngeal functioning; especially mucosal wave assessment, ultrasharp visualization of lesions on the vocal folds, and overall functioning of the larynx.  Details of Procedure: A 70 degree rigid telescopic laryngoscope or a distal chip flexible scope was used. The lighting was obtained via a light cable connected to a stroboscopic unit. The telescope was inserted either transorally or transnasally until the vocal folds could be visualized. The patient was instructed to sustain the vowel  ee  at a comfortable pitch and loudness as the voice was monitored by a microphone connected to a  fundamental frequency tracker. This circuit tracked vocal periodicity, allowing the light to flash in synchrony with the glottal cycles. A setting on the stroboscope was set to change the phase of light strobing with relation to the vocal fundamental frequency, producing an image of 1 to 2 glottal cycles every second. The video images were recorded for analysis. Use of the variable speed, slow and stop scan allowed careful study of pertinent segments of laryngeal function to increase accuracy of clinical assessments of function and dysfunction.  In particular, features of glottal closure, mucosal wave on the vocal fold cover and laryngeal symmetry were analyzed. Lastly, the patient was asked to phonate speech samples and auditory/perceptual evaluation of voice and resonance were performed.  The vocal quality was carefully evaluated for hoarseness, breathiness, loudness, phrase length and intelligibility to determine the source of dysphonia.    Findings:   A. BEHAVIORAL & QUALITATIVE EVALUATION OF VOICE AND RESONENCE   Comments: F0 150 MPT: 3s  Vocal Quality: strained    Pitch Range:  Moderately reduced   Phrase Length:  Moderately reduced  Vocal Loudness: Mildly reduced  Dysarthria: No    B. LARYNGOVIDEOSTROBOSCOPY   Anatomic/Physiological Deviations:  LPR with postcricoid edema and vocal process ulceration, presbylarynx, severe supraglottic hyperfunction with running speec  Mucosal wave: Right:  No restriction     Left: No restriction  Bilateral Vocal Fold Vibration: Symmetric  Vocal Process: Right: No restriction    Left:  No restriction  Vocal Fold closure: {Complete glottal closure    Complication(s): None  Disposition: Patient tolerated the procedure well            Supraglottic hyperfunction with speech      Fiberoptic Endoscopic Evaluation of Swallowing (CPT 29479)  and Interpretation of Swallowing (CPT 23257)    Indications: See above notes for complete history and physical. Patient complains of dysphagia to  both solids and liquids and/or there is suggestion on history and endoscopic exam of the presence of dysphagia causing medical complaints.  Swallowing evaluation is being performed to assess the presence and degree of dysphagia, and to recommend a safe diet.     Pulmonary Status:  No PNA   Current Diet:              regular                                        thin liquids      Consistency Amounts:  Thin Liquid: 1 tsp   Puree: 1 tsp  Solid: cookies            Positioning: upright in a chair  Oral Peripheral Exam: See physical exam section.  Anatomic Notes: See Videostroboscopy report for assessment of anatomy and laryngeal functioning  Pharyngeal secretions prior to administration of liquid or food: No  Oral Phase Abnormal Findings: No abnormal behavior observed  Behavioral Adaptations: No abnormal behavior observed  Pharyngeal Phase Abnormal Findings: no penetration or aspiration, no coughing       Recommended Diet:  regular                                        thin liquids               Review of Relevant Clinical Data     Labs:  No results found for: TSH  No results found for: NA, CO2, BUN, CREAT, GLUCOSE, PHOS  No results found for: WBC, HGB, HCT, MCV, PLT  No results found for: CONY  No components found for: RHEUMATOIDFACTOR,  RF  No results found for: CRP  No components found for: CKTOT, URICACID  No components found for: C3, C4, DSDNAAB, NDNAABIFA  No results found for: MPOAB       Impression & Plan     IMPRESSION: Mr. Pretty is a 64 year old male who is being seen for chronic cough. Given medical comorbidities, pulmonary, cardiac and hepatic this increases deconditioning and poor breath support, however, was able to evaluate and determine coughing with running speech during scope exam which revealed sever supraglottic hyperfunction. Referred for voice therapy. This will improve some of his symptoms but will not resolve his cough completely given his comorbidities.     RETURN VISIT: follow up in 3  months, or earlier as needed.     Thank you for the kind referral and for allowing me to share in the care of Mr. Pretty. If you have any questions, please do not hesitate to contact me.        Effie Marx MD    of Otolaryngology - Head and Neck Surgery   Voice, Airway, and Swallowing Disorders   [facility] at the [facility]    Clinics & Surgery [address] [address]  Phone: [phone]  Fax: [phone]    [place] [address]  Phone: [phone]  Fax: [phone]

## 2019-10-14 NOTE — PROGRESS NOTES
"[facility] VOICE [facility]  Asa Jones Jr., M.D., F.A.C.S.  Stacie Méndez M.D., M.P.H.  Fe Cherry, Ph.D., CCC/SLP  Rosa Maria Kirkland M.M. (voice), M.JANIS., CCC/SLP  Munir Rodriguez M.M. (voice), M.JANIS., CCC/SLP    [facility]  VOICE/SPEECH/BREATHING EVALUATION AND LARYNGEAL EXAMINATION REPORT    Patient: Charan Prtety  Date of Visit: 10/10/2019    Clinician: Fe Cherry, Ph.D., CCC/SLP  Seen in conjunction with: Dr. Marx    CHIEF COMPLAINT:  Cough and voice problems    HISTORY  Charan Pretty is a 64 year old presenting today for evaluation of cough.    Please refer to Dr. Marx s dictation for a more complete history and impressions.   Salient details of his history are as follows:    Three year Hx coughing, worsening, but with some improvement with intubation on 9/10, during which \"a lot of fluid was drained\"    Cough comes on with talking and eating    Cough was also exacerbated by blood pressure medication; not currently taking    DIAGNOSIS/REASON FOR REFERRAL  Cough and Dysphonia/ Evaluate, perform laryngeal exam, treat as appropriate      CURRENT PATIENT COMPLAINTS    Primary: cough    Secondary: voice    Denies significant dyspnea, dysphagia, or pain    OTHER PERTINENT HISTORY    Complex medical Hx; please see Dr. Marx's note and elsewhere in this chart    OBJECTIVE FINDINGS  VOICE/ SPEECH/ NON-COMMUNICATIVE LARYNGEAL BEHAVIORS EVALUATION  An evaluation of the cough and voice was accomplished today; salient features are as follows:    Palpation of the laryngeal area shows:    No tenderness of the thyrohyoid area     Cough/ Throat clear:    Tight dry cough that comes on frequently with talking; sounds consistent with upper airway cough    Breathing pattern:    Can be within normal limits and adequate, but sometimes shallow or guarded as he avoids cough     Inspirations are often inadequate for speech in volume     Phonation is often not coordinated with respiration    No overt tension is " evident, except that there is some upper body tension when he tries to suppress cough    Voice quality is characterized by    Moderate backward focus    Tense/tight quality, increasing as cough comes on    Narrow resonance    Habitual pitch was not formally tested, but is judged to be lower than optimal for him    Loudness is mildly reduced due to the backward focus and thin, tight quality    Sustained vowels: start normal but become increasingly tight, ending with cough    CAPE-V Overall Severity:   21/100    LARYNGEAL EXAMINATION    Endoscopic laryngeal examination was performed by:  Effie Marx MD  I provided technical support, and provided the protocol of instructions for the patient.  Type of exam:   Flexible endoscopy with chip-tip technology and stroboscopy  This exam shows:    Laryngeal and Vocal Fold Mucosa    Essentially healthy laryngeal mucosa    Arytenoid edema and erythema, as well as erythema of the medial arytenoid walls     Posterior commissure hypertrophy    moderate presence of pooled thickened secretions throughout the laryngeal area    Status of vocal fold mucosa:   o mildly dry and vascularized    Neurological and Functional Integrity of the Larynx    Vocal folds are mobile and meet at midline; movement is brisk and symmetric; exam is neurologically normal     Normal function is evident during a task of 20 quickly repeated vowels    On phonation, glottic closure is mildly weak at the midfold  o Spindle-shaped configuration of the glottis during many phonatory tasks    No cough throughout the initial portion of the exam     Supraglottic Function and Therapy Probes    Inconsistent moderate to severe four-way constriction of the supraglottic larynx during connected speech    Therapy probes show   o Increased talking leads to coughing  o Constriction seems to result in cough as well as be a result of the cough; there can be a complete sphincter-like closing of the supraglottic space during  attempts at phonation    Stroboscopy provided the following additional information:    The mucosa is stiff on the left, resulting in the midfold glottic gap    The mucosal wave is asymmetric, with chasing asymmetry     Dr. Marx and I reviewed this laryngeal exam with Mr. Pretty today, and I provided pertinent explanations, as well as written information:    Endoscopic findings are consistent with audio-perceptual assessment and patient Hx/complaints.    his symptoms are accounted for by obvious laryngeal irritation and muscular hyperfunction, both of which can be related to the cough     Because there appears to be a functional component to his symptoms, he would most likely benefit from functional speech therapy.        THERAPEUTIC ACTIVITIES  Today Mr. Pretty participated in the following therapeutic activities:    Asked many questions about the nature of his symptoms, and I answered all of these thoroughly.    [place] new exercises to promote ***.    Practiced techniques for ***.    I provided instruction for ***.    [place] concepts of ***.    In response to his questions, I provided explanations ***.    [place] concepts of applying ice, heat, and massage to the tender areas of his neck, in order to reduce pain.    [place] concepts and technqiues for using saline and plain-water gargling nasal irrigation steam guaifenesin to reduce the thickened secretions / laryngeal irritation.    [place] concepts and techniques for improving topical and systemic hydration     [place] concepts and techniques for reduction of vocal fold impact.    [place] concepts and strategies managing laryngopharyngeal reflux disorder, to reduce laryngeal inflammation.    I provided instruction for techniques and strategies to reduce the chronic cough/throat clearing  o alternative behaviors such as voiceless glottic coup, humming, swallowing, etc. were taught  o strategies for reducing mucosal irritation were taught    I  instructed him as to the use of an amplification device.    [place] techniques for optimal breathing technique.    [place] concepts of exercises to recover singing technique, most especially using easy pitch glides and exercises to connect singing voice to speaking voice.    [place] exercises for tissue mobilization to prevent break down scarring.    [place] a regimen for post-operative voice use and care to ensure optimal healing.    [place]ryan an optimal practice regimen for rehabilitation exercises.    Demonstrated previous exercises.  o demonstrated improved technique  o appropriate redirection provided  o instruction provided for increased level of complexity/difficulty    [place]ryan exercises for upper body relaxation during phonation.  o demonstrated moderate upper body tension  o good self-awareness  o improvement in voice quality during exercises    [place]ryan exercises for optimal respiratory mechanics for speech and singing.  o I provided explanation of the anatomy and physiology of respiration for speech and singing; he found this to be helpful  o he demonstrated clavicular/neck/shoulder involvement in inhalation  o demonstrated difficulty allowing abdominal relaxation for inhalation  o demonstrated acceptable abdominal relaxation for inhalation  o with guidance, learned improved abdominal relaxation for inhalation  o learned techniques for optimal airflow on exhalation  o practiced in prone and supine positions on the massage table; this was helpful  o Bryan a respiratory pacing exercise; this was helpful.  o good learning, but will need practice  o acceptable  minimal  improvement in airflow and respiratory mechanics    [place]ryan exercises to add phonation to the optimal flowing airstream.  o Semi-occluded vocal tract exercises with a *** were most facilitating  o *** were most facilitating  o progressed from neutral syllables to  o Instructed to use as a voice warm up, cool down, coordination of breath flow  with phonation, and for tissue mobilization.  o good learning, but will need practice     [place] exercises for improved glottic closure.  o able to produce acceptable glottic coup    [place] exercises for improved airflow during phonation.  o speech material with aspirate onsets was facilitating  o speech material with *** was facilitating  o Instructed at the word and phrase level.  o learned techniques to reduce glottal zimmer and improve breath flow    [place] exercises to experience a more forward sensation during phonation.  o speech material with nasal continuants was facilitating  o speech material that elicits a high, forward tongue position was facilitating  o able to recognize improvement in quality and comfort  o able to progress to level of   o good learning, but will need practice    [place] techniques to use an optimal pitch range in speech.  o today s pitch range:   o able to maintain in     [place] exercises to maintain the improved airflow and forward focus in singing.  o did a variety of glides, scales, and arpeggio patterns on a variety of neutral syllables  o learned how to apply the concepts to repertoire    Developed a checklist of factors.    [place] concepts of post-operative voice use and care, to optimize healing.    [place] exercises to improve his perceived loudness in noisy situations, without placing more burden on the larynx.    [place] a regimen for optimal warm-up and cool-down.    Recorded a pitch glide exercise to monitor pitch range on a daily basis.    [place] concepts of an optimal regimen for practice.  o he should use an interval schedule of practice, with brief periods of practice frequently throughout each day  o [place] concepts of volitional practice to facilitate motor learning.    I provided an after visit summary to help facilitate practice.    An audio recording of today's therapeutic activities was provided, to facilitate practice.    ASSESSMENT /  "PLAN  IMPRESSIONS:  This evaluation has resulted in the following diagnosis/diagnoses for Mr. Pretty  {[facility] E[facility]-ICD-10 CODES:443666}  {[facility] ENT-ICD-10 CODES:051859}  {[facility] ENT-ICD-10 CODES:545750}.      Laryngeal evaluation demonstrated ***    Perceptual evaluation demonstrated ***    Laryngeal function studies demonstrated ***    {DYSPHONIA:415408886}  STIMULABILITY: results of therapy probes during perceptual and laryngeal evaluation demonstrate improvement with {Therapy Probe Response:195711}  RECOMMENDATIONS:     {Therapy recommendations:044487}    He demonstrates a {PROGNOSIS:306587244::\"Good\"} prognosis for improvement given adherence to therapeutic recommendations. Therapeutic     Positive indicators: commitment to process; diagnosis is known to respond to functional treatment;     Negative indicators: none      We briefly began therapy today, working on strategies to improve vocal health and technique    No therapy is planned, therefore there are no goals and Mr. Pretty is discharged from this service.    I will see Mr. Pretty as Dr. *** deems appropriate.    TREATMENT PLAN  Speech therapy    DURATION/FREQUENCY OF TREATMENT  *** weekly or bi-weekly one-hour sessions, with *** monthly one-hour follow-up sessions  A single session, accomplished today    DISCHARGE SUMMARY  Mr. Pretty has had a single session of evaluation and therapy today.  Based on the evaluation, the functional therapy provided was considered medically necessary to meet the goal of ***.  No further intervention is deemed necessary *** will be provided in this facility.  Therefore, he is discharged with the goals listed below.    GOALS  Patient goal:    {GOALS:638195046}    Short-term goal(s): Within the first 4 sessions, Mr. Pretty will:  1.  [facility]    Long-term goal(s): In *** months, Mr. Pretty will:  1.  {'s LONG TERM GOALS:917762}    Certification period: October 14, 2019 - ***    This " "treatment plan was developed with the patient who agreed with the recommendations.      TOTAL SERVICE TIME: *** minutes  {LOS:802472::\"NO CHARGE FACILITY FEE (70744)\"}      Fe Cherry, Ph.D., [facility]-SLP  Speech-Language Pathologist  Director, [facility]  [phone]            "

## 2019-10-15 NOTE — PROGRESS NOTES
"[facility] VOICE [facility]  Asa Jones Jr., M.D., F.A.C.S.  Stacie Méndez M.D., M.P.H.  Fe Cherry, Ph.D., CCC/SLP  Rosa Maria Kirkland M.M. (voice), M.JANIS., CCC/SLP  Munir Rodriguez M.M. (voice), M.JANIS., CCC/SLP    [facility]  VOICE/SPEECH/BREATHING EVALUATION AND LARYNGEAL EXAMINATION REPORT    Patient: Charan Pretty  Date of Visit: 10/10/2019    Clinician: Fe Cherry, Ph.D., CCC/SLP  Seen in conjunction with: Dr. Marx    CHIEF COMPLAINT:  Cough and voice problems    HISTORY  Charan Pretty is a 64 year old presenting today for evaluation of cough.    Please refer to Dr. Marx s dictation for a more complete history and impressions.   Salient details of his history are as follows:    Three year Hx coughing, worsening, but with some improvement with intubation on 9/10, during which \"a lot of fluid was drained\"    Cough comes on with talking and eating    Cough was also exacerbated by blood pressure medication; not currently taking    DIAGNOSIS/REASON FOR REFERRAL  Cough and Dysphonia/ Evaluate, perform laryngeal exam, treat as appropriate    CURRENT PATIENT COMPLAINTS    Primary: cough    Secondary: voice    Denies significant dyspnea, dysphagia, or pain    OTHER PERTINENT HISTORY    Complex medical Hx; please see Dr. Marx's note and elsewhere in this chart    OBJECTIVE FINDINGS  VOICE/ SPEECH/ NON-COMMUNICATIVE LARYNGEAL BEHAVIORS EVALUATION  An evaluation of the cough and voice was accomplished today; salient features are as follows:    Palpation of the laryngeal area shows:    No tenderness of the thyrohyoid area     Cough/ Throat clear:    Tight dry cough that comes on frequently with talking; sounds consistent with upper airway cough    Breathing pattern:    Can be within normal limits and adequate, but sometimes shallow or guarded as he avoids cough     Inspirations are often inadequate for speech in volume     Phonation is often not coordinated with respiration    No overt tension is " evident, except that there is some upper body tension when he tries to suppress cough    Voice quality is characterized by    Moderate backward focus    Tense/tight quality, increasing as cough comes on    Narrow resonance    Habitual pitch was not formally tested, but is judged to be lower than optimal for him    Loudness is mildly reduced due to the backward focus and thin, tight quality    Sustained vowels: start normal but become increasingly tight, ending with cough    CAPE-V Overall Severity:   21/100    LARYNGEAL EXAMINATION    Endoscopic laryngeal examination was performed by:  Effie Marx MD  I provided technical support, and provided the protocol of instructions for the patient.  Type of exam:   Flexible endoscopy with chip-tip technology and stroboscopy  This exam shows:    Laryngeal and Vocal Fold Mucosa    Essentially healthy laryngeal mucosa    Arytenoid edema and erythema, as well as erythema of the medial arytenoid walls     Posterior commissure hypertrophy    Moderate presence of pooled thickened secretions throughout the laryngeal area    Status of vocal fold mucosa:   o mildly dry and vascularized    Neurological and Functional Integrity of the Larynx    Vocal folds are mobile and meet at midline; movement is brisk and symmetric; exam is neurologically normal     Normal function is evident during a task of 20 quickly repeated vowels    On phonation, glottic closure is mildly weak at the midfold  o Spindle-shaped configuration of the glottis during many phonatory tasks    No cough throughout the initial portion of the exam     Supraglottic Function and Therapy Probes    Inconsistent moderate to severe four-way constriction of the supraglottic larynx during connected speech    Therapy probes show   o Increased talking leads to coughing  o Constriction seems to result in cough as well as be a result of the cough; there can be a complete sphincter-like closing of the supraglottic space during  attempts at phonation    Stroboscopy provided the following additional information:    The mucosa is stiff on the left, resulting in the midfold glottic gap    The mucosal wave is asymmetric, with chasing asymmetry     Dr. Marx and I reviewed this laryngeal exam with Mr. Pretty today, and I provided pertinent explanations, as well as written information:    Endoscopic findings are consistent with audio-perceptual assessment and patient Hx/complaints.    his symptoms are accounted for by obvious laryngeal irritation and muscular hyperfunction, both of which can be related to the cough     Because there appears to be a functional component to his symptoms, he would most likely benefit from functional speech therapy.    CLINICAL SWALLOW EVALUATION (CPT 29212)  Clinician Completing CSE: Fe Cherry, Ph. D,. CCC-SLP     ORAL MOTOR EXAMINATION:  Face: Normal/ Asymmetrical/ Masked/ Flat affect  Oral Musculature: generally intact  Structural Abnormalities: none present  Secretion Management: adequate/WNL  Mucosal Quality: adequate  Mandibular Strength and Mobility: intact  Oral Labial Strength and Mobility: WFL  Lingual Strength and Mobility: WFL  Velar Elevation: intact  Buccal Strength and Mobility: intact    FIBEROPTIC ENDOSCOPIC EVALUATION OF SWALLOWING (FEES)  Procedure: Flexible endoscopy with chip-tip technology without stroboscopy . Anesthetization spray was applied during laryngeal exam, but not reapplied during FEES.   Performed by: Dr. Marx  Indications for FEES:  See above notes for complete history. Patient complains of dysphagia and/or there is suggestion on history of the presence of dysphagia causing medical complaints (eating leads to coughing). Therefore, Dr. Marx deemed it medically necessary to proceed with the FEES screening protocol. PO trials were evaluated under fiberoptic endoscopy completed by Dr. Marx.    I provided the PO trials, the protocol of instructions, and therapy probes for  the patient. I also provided skilled evaluation of the swallow, and feedback/explanation to the patient.    Swallowing evaluation is being performed to assess the presence and degree of dysphagia, and to recommend a safe diet.    Pre-Swallow Secretions:    Location and Amount: moderate presence of pooled secretions in pyriform sinuses    Thin Liquid Trials:    Trial Number: 3, 4    Amount and Mode of Presentation: 1 tsp delivered by straw    Premature Spillage: not observed    Residual Location and Amount: not observed    Penetration/Aspiration: not observed    Therapy Probes and Results: none    Puree Texture Trails:     Trial Number: 1,2    Amount and Mode of Presentation: 2 spoonsful of applesauce    Premature Spillage: not observed    Residual Location and Amount: trace residue in valleculae    Penetration/Aspiration: not observed    Therapy Probes and Results: residual cleared easily on second swallow    Solid Texture Trials:    Trial Number:  5    Amount and Mode of Presentation: one bite of Geno Doone cookie    Premature Spillage: none observed    Residual Location and Amount: none observe    Penetration/Aspiration:none observed    Therapy Probes and Results: none    Diet Recommendations:regular diet with thin liquids    Recommended Feeding/Eating Techniques: no precautions needed    ASSESSMENT / PLAN  IMPRESSIONS:  This evaluation has resulted in the following diagnosis/diagnoses for Mr. Pretty  R05 (Chronic Cough)  R49.0 (Dysphonia)  J38.7 (Irritable Larynx Syndrome).      Laryngeal evaluation demonstrated mucosal irritation and muscular hyperfunction    Perceptual evaluation demonstrated frequent dry cough, apparently triggered by talking; voice quality is tense-tight with poor airflow  Dysphonia/discomfort is accounted for by the imbalanced function of the intrinsic and extrinsic laryngeal musculature    FEES indicates some mild residual of puree texture, but this clears easily; cough with eating  is more likely accounted for by overall laryngeal irritation than any deficit in the swallow  Cough/throat clear are accounted for by the hypersensitivity of the larynx and pharynx as evidenced by case history, patient complaints and absence of other organic findings; hypersensitivity is compounded by imbalance in the function of the intrinsic and extrinsic laryngeal musculature during connected speech  Because there appears to be a functional component to his symptoms, he would most likely benefit from a course of functional speech therapy  STIMULABILITY: results of therapy probes during perceptual and laryngeal evaluation demonstrate coughing is clearly related to phonation  RECOMMENDATIONS:     A course of speech therapy is recommended to optimize vocal technique, improve voice quality, promote reduced discomfort, effort and fatigue and help reduce chronic cough and mucosal irritation.    He demonstrates a Good prognosis for improvement given adherence to therapeutic recommendations. Therapeutic     Positive indicators: commitment to process; diagnosis is known to respond to functional treatment;     Negative indicators: none    TREATMENT PLAN  Speech therapy    DURATION/FREQUENCY OF TREATMENT  Six weekly or bi-weekly one-hour sessions, with four monthly one-hour follow-up sessions  A single session, accomplished today    GOALS  Patient goal:    To have a normal and acceptable voice quality  To reduce his cough to acceptable levels    Long-term goal(s): In 4 months, Mr. Pretty will:  1.  Report a week of typical vocal activities, in which dysphonia and cough during talking do not exceed a level of 2 out of 10, 80% of the time   2.  Report a week with no more than two episodes of coughing or throat-clearing per day, that do not last more than two seconds  3.  In a 20-minute conversation task, demonstrate tense/tight quality and backward focus that do not exceed a level of 3 out of 10, 80% of the time, by  therapist judgment    This treatment plan was developed with the patient who agreed with the recommendations.      TOTAL SERVICE TIME: 30 minutes  EVALUATION OF VOICE AND RESONANCE (78282)  CLINICAL SWALLOW EVALUATION (42183)  NO CHARGE FACILITY FEE (03408)      Fe Cherry, Ph.D., [facility]-SLP  Speech-Language Pathologist  Director, [facility]  [phone]

## 2019-10-16 NOTE — TELEPHONE ENCOUNTER
Referral from Florian Mai, DIAMANTE at [facility] for MENDOZA possible DENISE with hepatopulmonary syndrome (see info in CE).    Patient quit drinking 25 yrs ago, but did restart a bit ago only having a couple one night a weekend. Quit completely in May 2019. He did do a CD eval at [facility] the other day with no recs given for treatment.     He does require O2 when walking. Does have shunt on bubble in CE.     MELD 14    Ascites - no taps ever, but on lasix  HE - none reported and no meds  GIB - yes  EGD - 6/8/19 with banding, last on 9/3/19  Colon - [facility] 8/16 - in media tab with repeat due in 3 years    Coming [facility] for what sounds like mechanical issue with swallowing.     Plan: full evaluation with Dr. Sanchez on 10/29    Patient agreed and will have either his Son, daughter, or both come with.

## 2019-10-18 ENCOUNTER — OFFICE VISIT (OUTPATIENT)
Dept: OTOLARYNGOLOGY | Facility: CLINIC | Age: 64
End: 2019-10-18
Attending: OTOLARYNGOLOGY
Payer: COMMERCIAL

## 2019-10-18 ENCOUNTER — TELEPHONE (OUTPATIENT)
Dept: OTOLARYNGOLOGY | Facility: CLINIC | Age: 64
End: 2019-10-18

## 2019-10-18 DIAGNOSIS — J38.7 IRRITABLE LARYNX SYNDROME: ICD-10-CM

## 2019-10-18 DIAGNOSIS — R05.9 COUGH: ICD-10-CM

## 2019-10-18 DIAGNOSIS — R49.0 DYSPHONIA: Primary | ICD-10-CM

## 2019-10-18 NOTE — PROGRESS NOTES
"After Visit Summary    Patient: Charan Pretty  Date of Visit: 10/18/2019    Hygiene:     Systemic Hydration: internal hydration of the entire body  o Sip water throughout the day (staying within the limits the Liver Doc)      Topical Hydration: hydration for the surface mucosa of the larynx and vocal folds.    Please follow strategies learned on the \"Tips for Topical Hydration\" handout  o Nasal Spray 1-2x/day (Saline Nasal Spray)  - 3 puffs in each nostril; sniff and then blow your nose  o Gargling  - Plain water or salt water at least 2x/day (or as much as you want)  - Gargle with a voice  - Gargle with a voice and tilt your head from side to side    Cough:   Trigger: Talking       Sip of water (cold, hot, carbonated)    Dry swallow    Chin tuck with a swallow      Gargle  o With a voice  o Tilt your head side to side    Angry  repeated \"sh sh sh sh\"    Suck on a lozenge with Pectin (avoid mint or menthol) or a sugar-free candy, gum, \"wet\" snacks (apples, pineapple, grapes, etc).  Also consider Xylitol products, like the Spry brand of lozenges, gum, spray    Wait \"urge surf\"      Voice (2-3x/day unless otherwise noted):    Semi-occluded vocal tract exercise:   o Cup and Bubbles (straw in 1 to 1.5  of water) 3x/day for 1-2 min:  o 3x: blow 10-15 seconds with no voice and keep bubbles consistent.  o 3x: blow bubbles and add a sustained  who  or an  oo  (comfy pitch for you )  o 3x: blow bubbles and vary  who  gliding up and down             Up and down like a sine wave  o 3x: blow bubbles and glide from high to low  o 3x: blow bubbles and \"engine rev\"   These exercises are great for:    *warm up / cool down - Part of the morning routing and before and after extended voice use.    *tissue mobilization exercise - Improving the condition and pliability of the vocal folds.    *Abdominal breathing and applying optimal breath flow to speech/singing.       Next Appointments  Oct 31 at 1pm  Nov 14 at 1pm  Dec " at 1pm     DORA Finney (rehan), M.A., CFY-SLP  Speech Language Pathology Clinical Fellow  [facility] Trained Vocologist   [facility] Voice [facility]   [phone]  [email or web address]

## 2019-10-18 NOTE — LETTER
"10/18/2019      RE: Charan Pretty  [address]  [facility] [address]       [facility] VOICE CLINIC  THERAPY NOTE (CPT 29048)    Patient: Charan Pretty  Date of Service: 10/18/2019  Referring physician: Dr. Marx  Impressions from most recent evaluation: (10/10/2019):  \"R05 (Chronic Cough) R49.0 (Dysphonia) J38.7 (Irritable Larynx Syndrome).  Laryngeal evaluation demonstrated mucosal irritation and muscular hyperfunction. Perceptual evaluation demonstrated frequent dry cough, apparently triggered by talking; voice quality is tense-tight with poor airflow. Dysphonia/discomfort is accounted for by the imbalanced function of the intrinsic and extrinsic laryngeal musculature. FEES indicates some mild residual of puree texture, but this clears easily; cough with eating is more likely accounted for by overall laryngeal irritation than any deficit in the swallow. Cough/throat clear are accounted for by the hypersensitivity of the larynx and pharynx as evidenced by case history, patient complaints and absence of other organic findings; hypersensitivity is compounded by imbalance in the function of the intrinsic and extrinsic laryngeal musculature during connected speech. Because there appears to be a functional component to his symptoms, he would most likely benefit from a course of functional speech therapy.\"    SUBJECTIVE:  Since the patient's last session, they report the following:     Overall symptoms are about the same; this is his first therapy session    Starting the process for liver transplantation; lots of upcoming appointments    Coughs the most when he is talking on the phone    Biggest cough trigger is talking    OBJECTIVE:  PATIENT REPORTED MEASURES:  Patient Specific Goal Metrics:  Dysponia SLP Goals 10/18/2019   How severe is your cough /throat clearing, if 0 is no cough at all and 10 is the worst cough? 10   How much does your cough/throat-clearing problem bother you?            Very Much "     THERAPEUTIC ACTIVITIES    Counseling and Education:    Asked many questions about the nature of his symptoms, and I answered all of these thoroughly.    Instructed concepts and techniques for optimal vocal hygiene including:    Systemic hydration, including strategies for increasing daily water intake    Topical hydration - Gargling, saline nasal irrigation, humidification, steam, guaifenesin    Environmental barriers to healthy voicing - noise, inhaled irritants, room acoustics    Awareness and reduction of phonotraumatic behaviors    Moderating voice use    Substituting non-voice alternative behaviors    Avoiding cough and throat clearing    Chronic cough / throat clearing reduction therapy    Suppression and substitution strategies were instructed including    Swallowing substitution techniques    Breathing suppression techniques to reduce laryngeal tension    Low impact glottic coup and soft cough    Techniques to raise awareness of habitual throat clearing    Additionally he was instructed to keep a log of what circumstances are eliciting cough / throat clear    Semi-Occluded Vocal Tract (SOVT) exercises instructed to reduce laryngeal tension, promote vocal fold pliability, and coordinate respiration and phonation    Straw with water resistance was found to be most facilitating     Sustained phonation, and voice vs. voiceless productions used to promote easy voicing and raise awareness of laryngeal tension    Ascending and descending glides utilized to promote vocal fold pliability    Instructed to use these exercises as a warm-up / cooldown, and to re-calibrate the voice throughout the day.    80% accuracy with mild to moderate clinician support      A regimen for home practice was instructed.    I provided handouts of today's therapeutic activities to facilitate practice.    ASSESSMENT/PLAN  PROGRESS TOWARD LONG TERM GOALS:   Minimal at this point, as this is first session, but good learning  "today    IMPRESSIONS: R05 (Chronic Cough) R49.0 (Dysphonia) J38.7 (Irritable Larynx Syndrome).Mr. Pretty demonstrated good learning and felt exercises learned today already felt helpful. He was accompanied to today's session by his sister, Norbert. Mr. Pretty had many coughing fits during the initial interview portion of the session; however, after completing gargling exercises early on in the therapy session, he did not cough throughout the rest of the hour. Mr. Pretty was encouraged by this and is excited to begin daily practice.     PLAN: I will see Mr. Pretty in 2 weeks, at which point we will advance cough suppression and voice therapy exercises with the potential use of flow phonation and/or CTT.  For practice goals see AVS.     TOTAL SERVICE TIME: 60 minutes  TREATMENT (95566): 60 minutes  NO CHARGE FACILITY FEE (85675)    DORA Finney (music), M.A., CFY-SLP  Speech Language Pathology Clinical Fellow  [facility] Trained Vocologist   [facility]   [phone]  [email or web address]    Supervision Provided By:  Rosa Maria Kirkland M.M. (voice) MBonitaABonita, CCC/SLP  Speech-Language Pathologist  [facility]  [phone]                After Visit Summary    Patient: Charan Pretty  Date of Visit: 10/18/2019    Hygiene:     Systemic Hydration: internal hydration of the entire body  o Sip water throughout the day (staying within the limits the Liver Doc)      Topical Hydration: hydration for the surface mucosa of the larynx and vocal folds.    Please follow strategies learned on the \"Tips for Topical Hydration\" handout  o Nasal Spray 1-2x/day (Saline Nasal Spray)  - 3 puffs in each nostril; sniff and then blow your nose  o Gargling  - Plain water or salt water at least 2x/day (or as much as you want)  - Gargle with a voice  - Gargle with a voice and tilt your head from side to side    Cough:   Trigger: Talking       Sip of water (cold, hot, carbonated)    Dry swallow    Chin " "tuck with a swallow      Gargle  o With a voice  o Tilt your head side to side    Angry  repeated \"sh sh sh sh\"    Suck on a lozenge with Pectin (avoid mint or menthol) or a sugar-free candy, gum, \"wet\" snacks (apples, pineapple, grapes, etc).  Also consider Xylitol products, like the Spry brand of lozenges, gum, spray    Wait \"urge surf\"      Voice (2-3x/day unless otherwise noted):    Semi-occluded vocal tract exercise:   o Cup and Bubbles (straw in 1 to 1.5  of water) 3x/day for 1-2 min:  o 3x: blow 10-15 seconds with no voice and keep bubbles consistent.  o 3x: blow bubbles and add a sustained  who  or an  oo  (comfy pitch for you )  o 3x: blow bubbles and vary  who  gliding up and down             Up and down like a sine wave  o 3x: blow bubbles and glide from high to low  o 3x: blow bubbles and \"engine rev\"   These exercises are great for:    *warm up / cool down - Part of the morning routing and before and after extended voice use.    *tissue mobilization exercise - Improving the condition and pliability of the vocal folds.    *Abdominal breathing and applying optimal breath flow to speech/singing.       Next Appointments  Oct 31 at 1pm  Nov 14 at 1pm  Dec at 1pm     DORA Finney (rehan), M.A., CFY-SLP  Speech Language Pathology Clinical Fellow  [facility] Trained Vocologist   [facility]   [phone]  [email or web address]          "

## 2019-10-18 NOTE — TELEPHONE ENCOUNTER
LVM with patient letting him know Dates/Time of appts with Brittanie.  Did let him know that they are scheduled under Rosa Maria but he will be seeing Brittanie.  Left call center number in case he needs to reschedule.

## 2019-10-18 NOTE — PROGRESS NOTES
"[facility] VOICE CLINIC  THERAPY NOTE (CPT 59371)    Patient: Charan Pretty  Date of Service: 10/18/2019  Referring physician: Dr. Marx  Impressions from most recent evaluation: (10/10/2019):  \"R05 (Chronic Cough) R49.0 (Dysphonia) J38.7 (Irritable Larynx Syndrome).  Laryngeal evaluation demonstrated mucosal irritation and muscular hyperfunction. Perceptual evaluation demonstrated frequent dry cough, apparently triggered by talking; voice quality is tense-tight with poor airflow. Dysphonia/discomfort is accounted for by the imbalanced function of the intrinsic and extrinsic laryngeal musculature. FEES indicates some mild residual of puree texture, but this clears easily; cough with eating is more likely accounted for by overall laryngeal irritation than any deficit in the swallow. Cough/throat clear are accounted for by the hypersensitivity of the larynx and pharynx as evidenced by case history, patient complaints and absence of other organic findings; hypersensitivity is compounded by imbalance in the function of the intrinsic and extrinsic laryngeal musculature during connected speech. Because there appears to be a functional component to his symptoms, he would most likely benefit from a course of functional speech therapy.\"    SUBJECTIVE:  Since the patient's last session, they report the following:     Overall symptoms are about the same; this is his first therapy session    Starting the process for liver transplantation; lots of upcoming appointments    Coughs the most when he is talking on the phone    Biggest cough trigger is talking    OBJECTIVE:  PATIENT REPORTED MEASURES:  Patient Specific Goal Metrics:  Dysponia SLP Goals 10/18/2019   How severe is your cough /throat clearing, if 0 is no cough at all and 10 is the worst cough? 10   How much does your cough/throat-clearing problem bother you?            Very Much     THERAPEUTIC ACTIVITIES    Counseling and Education:    Asked many questions about the " nature of his symptoms, and I answered all of these thoroughly.    Instructed concepts and techniques for optimal vocal hygiene including:    Systemic hydration, including strategies for increasing daily water intake    Topical hydration - Gargling, saline nasal irrigation, humidification, steam, guaifenesin    Environmental barriers to healthy voicing - noise, inhaled irritants, room acoustics    Awareness and reduction of phonotraumatic behaviors    Moderating voice use    Substituting non-voice alternative behaviors    Avoiding cough and throat clearing    Chronic cough / throat clearing reduction therapy    Suppression and substitution strategies were instructed including    Swallowing substitution techniques    Breathing suppression techniques to reduce laryngeal tension    Low impact glottic coup and soft cough    Techniques to raise awareness of habitual throat clearing    Additionally he was instructed to keep a log of what circumstances are eliciting cough / throat clear    Semi-Occluded Vocal Tract (SOVT) exercises instructed to reduce laryngeal tension, promote vocal fold pliability, and coordinate respiration and phonation    Straw with water resistance was found to be most facilitating     Sustained phonation, and voice vs. voiceless productions used to promote easy voicing and raise awareness of laryngeal tension    Ascending and descending glides utilized to promote vocal fold pliability    Instructed to use these exercises as a warm-up / cooldown, and to re-calibrate the voice throughout the day.    80% accuracy with mild to moderate clinician support      A regimen for home practice was instructed.    I provided handouts of today's therapeutic activities to facilitate practice.    ASSESSMENT/PLAN  PROGRESS TOWARD LONG TERM GOALS:   Minimal at this point, as this is first session, but good learning today    IMPRESSIONS: R05 (Chronic Cough) R49.0 (Dysphonia) J38.7 (Irritable Larynx Syndrome).  Sukhwinder demonstrated good learning and felt exercises learned today already felt helpful. He was accompanied to today's session by his sister, Norbert. Mr. Pretty had many coughing fits during the initial interview portion of the session; however, after completing gargling exercises early on in the therapy session, he did not cough throughout the rest of the hour. Mr. Pretty was encouraged by this and is excited to begin daily practice.     PLAN: I will see Mr. Pretty in 2 weeks, at which point we will advance cough suppression and voice therapy exercises with the potential use of flow phonation and/or CTT.  For practice goals see AVS.     TOTAL SERVICE TIME: 60 minutes  TREATMENT (72109): 60 minutes  NO CHARGE FACILITY FEE (84963)    DORA Finney (music), M.A., CFY-SLP  Speech Language Pathology Clinical Fellow  [facility] Trained Vocologist   [facility]   [phone]  [email or web address]    Supervision Provided By:  Rosa Maria Kirkland M.M. (voice), M.A., CCC/SLP  Speech-Language Pathologist  [facility]  [phone]

## 2019-10-23 NOTE — TELEPHONE ENCOUNTER
RECORDS RECEIVED FROM: Internal/Care EVerywhere   DATE RECEIVED: 11-11   NOTES STATUS DETAILS   OFFICE NOTE from referring provider    Internal SOT   OFFICE NOTE from other cardiologist    Care Everywhere 10-2-19 [facility]   DISCHARGE SUMMARY from hospital    N/A    DISCHARGE REPORT from the ER   N/A    OPERATIVE REPORT    N/A    MEDICATION LIST   Internal    LABS     BMP   Care Everywhere 9-9-19   CBC   Care Everywhere 10-21-19   CMP   Care Everywhere 9-12-19   Lipids   N/A    TSH   Care Everywhere 8-30-19   DIAGNOSTIC PROCEDURES     EKG   In process    Monitor Reports   N/A    IMAGING (DISC & REPORT)      Echo   In process    Stress Tests   N/A    Cath   N/A    MRI/MRA   N/A    CT/CTA   N/A      Action    Action Taken 10-23: Requested 4 most recent EKGs from [facility]  10-23: Requested 8-30-19 echo from [facility]   10-23: Requested 2-14-18 echo from [facility]  11-7: Sent second request to [facility] and [facility]  11-8: Received EKG strips and sent to [facility]

## 2019-10-23 NOTE — TELEPHONE ENCOUNTER
RECORDS RECEIVED FROM: Internal    DATE RECEIVED: 1/6/20   NOTES STATUS DETAILS   OFFICE NOTE from referring provider Internal 10/9/19 Dr Sanchez    OFFICE NOTE from other specialist Care everywhere  8/15/19 Dr. das    DISCHARGE SUMMARY from hospital Care everywhere  8/30/19 dr. Olvera    DISCHARGE REPORT from the ER N/A    MEDICATION LIST Internal    IMAGING  (NEED IMAGES AND REPORTS)     CT SCAN Care Everywhere 4/5/19   CHEST XRAY (CXR) In process, Care everywhere  10/28/19-scheduled  9/8/19, 9/2/19, 8/31/19, 8/30/19, 8/28/19, 4/4/19   TESTS     PULMONARY FUNCTION TESTING (PFT) In process/care everywhere  10/28/19-scheduled   4/30/19-requested       Action 10/23/19   Action Taken Records request sent out to [facility] for images 9/2/19, 8/31/19, 8/30/19 and PFT 4/30/19     Action 10/24/19   Action Taken Reports and Images received and pushed into PACS

## 2019-10-23 NOTE — TELEPHONE ENCOUNTER
Spoke with the patient and confirmed Evals: Pre-Liver Eval appointments on 10/28/19, 10/29/19, 11/11/19 and 1/6/19.  Informed patient an itinerary can be accessed via [employer], and will be sent via [employer].

## 2019-10-28 ENCOUNTER — OFFICE VISIT (OUTPATIENT)
Dept: TRANSPLANT | Facility: CLINIC | Age: 64
End: 2019-10-28
Attending: INTERNAL MEDICINE
Payer: COMMERCIAL

## 2019-10-28 ENCOUNTER — ANCILLARY PROCEDURE (OUTPATIENT)
Dept: ULTRASOUND IMAGING | Facility: CLINIC | Age: 64
End: 2019-10-28
Attending: INTERNAL MEDICINE
Payer: COMMERCIAL

## 2019-10-28 ENCOUNTER — ANCILLARY PROCEDURE (OUTPATIENT)
Dept: GENERAL RADIOLOGY | Facility: CLINIC | Age: 64
End: 2019-10-28
Attending: INTERNAL MEDICINE
Payer: COMMERCIAL

## 2019-10-28 DIAGNOSIS — K74.60 CIRRHOSIS OF LIVER WITH ASCITES, UNSPECIFIED HEPATIC CIRRHOSIS TYPE (H): ICD-10-CM

## 2019-10-28 DIAGNOSIS — R18.8 CIRRHOSIS OF LIVER WITH ASCITES, UNSPECIFIED HEPATIC CIRRHOSIS TYPE (H): ICD-10-CM

## 2019-10-28 DIAGNOSIS — K75.81 NASH (NONALCOHOLIC STEATOHEPATITIS): ICD-10-CM

## 2019-10-28 DIAGNOSIS — K76.6 PORTAL HYPERTENSION (H): ICD-10-CM

## 2019-10-28 DIAGNOSIS — K74.60 CIRRHOSIS OF LIVER (H): Primary | ICD-10-CM

## 2019-10-28 LAB
ABO + RH BLD: NORMAL
AFP SERPL-MCNC: <1.5 UG/L (ref 0–8)
ALBUMIN SERPL-MCNC: 3.6 G/DL (ref 3.4–5)
ALBUMIN UR-MCNC: NEGATIVE MG/DL
ALP SERPL-CCNC: 104 U/L (ref 40–150)
ALT SERPL W P-5'-P-CCNC: 59 U/L (ref 0–70)
ANION GAP SERPL CALCULATED.3IONS-SCNC: 6 MMOL/L (ref 3–14)
APPEARANCE UR: CLEAR
AST SERPL W P-5'-P-CCNC: 47 U/L (ref 0–45)
BASE EXCESS BLDA CALC-SCNC: 0.9 MMOL/L
BILIRUB DIRECT SERPL-MCNC: 0.3 MG/DL (ref 0–0.2)
BILIRUB SERPL-MCNC: 0.8 MG/DL (ref 0.2–1.3)
BILIRUB UR QL STRIP: NEGATIVE
BLD GP AB SCN SERPL QL: NORMAL
BLD GP AB SCN TITR SERPL: NORMAL {TITER}
BLOOD BANK CMNT PATIENT-IMP: NORMAL
BUN SERPL-MCNC: 21 MG/DL (ref 7–30)
CALCIUM SERPL-MCNC: 8.9 MG/DL (ref 8.5–10.1)
CHLORIDE SERPL-SCNC: 111 MMOL/L (ref 94–109)
CHOLEST SERPL-MCNC: 153 MG/DL
CMV IGG SERPL QL IA: >8 AI (ref 0–0.8)
CO2 SERPL-SCNC: 28 MMOL/L (ref 20–32)
COHGB MFR BLD: 1 % (ref 0–2)
COLOR UR AUTO: NORMAL
CREAT SERPL-MCNC: 0.92 MG/DL (ref 0.66–1.25)
CREAT UR-MCNC: 12 MG/DL
DEPRECATED CALCIDIOL+CALCIFEROL SERPL-MC: 22 UG/L (ref 20–75)
EBV VCA IGG SER QL IA: >8 AI (ref 0–0.8)
ERYTHROCYTE [DISTWIDTH] IN BLOOD BY AUTOMATED COUNT: 13.6 % (ref 10–15)
FIO2-PRE: 21 %
GFR SERPL CREATININE-BSD FRML MDRD: 88 ML/MIN/{1.73_M2}
GLUCOSE SERPL-MCNC: 102 MG/DL (ref 70–99)
GLUCOSE UR STRIP-MCNC: NEGATIVE MG/DL
HCO3 BLD-SCNC: 24 MMOL/L (ref 21–28)
HCT VFR BLD AUTO: 43.5 % (ref 40–53)
HDLC SERPL-MCNC: 45 MG/DL
HGB BLD-MCNC: 13.8 G/DL (ref 13.3–17.7)
HGB BLD-MCNC: 13.9 G/DL (ref 13.3–17.7)
HGB UR QL STRIP: NEGATIVE
HIV 1+2 AB+HIV1 P24 AG SERPL QL IA: NONREACTIVE
INR PPP: 1.19 (ref 0.86–1.14)
IRON SATN MFR SERPL: 34 % (ref 15–46)
IRON SERPL-MCNC: 142 UG/DL (ref 35–180)
KETONES UR STRIP-MCNC: NEGATIVE MG/DL
LDLC SERPL CALC-MCNC: 80 MG/DL
LEUKOCYTE ESTERASE UR QL STRIP: NEGATIVE
MCH RBC QN AUTO: 29.7 PG (ref 26.5–33)
MCHC RBC AUTO-ENTMCNC: 32 G/DL (ref 31.5–36.5)
MCV RBC AUTO: 93 FL (ref 78–100)
METHGB MFR BLD: 0 % (ref 0–3)
NITRATE UR QL: NEGATIVE
NONHDLC SERPL-MCNC: 107 MG/DL
O2/TOTAL GAS SETTING VFR VENT: 21 %
OXYHGB MFR BLD: 96 % (ref 92–100)
PCO2 BLD: 36 MM HG (ref 35–45)
PH BLD: 7.44 PH (ref 7.35–7.45)
PH UR STRIP: 6 PH (ref 5–7)
PHOSPHATE SERPL-MCNC: 3.4 MG/DL (ref 2.5–4.5)
PLATELET # BLD AUTO: 69 10E9/L (ref 150–450)
PO2 BLD: 84 MM HG (ref 80–105)
POTASSIUM SERPL-SCNC: 3.8 MMOL/L (ref 3.4–5.3)
PROT SERPL-MCNC: 7.7 G/DL (ref 6.8–8.8)
PROT UR-MCNC: <0.05 G/L
PROT/CREAT 24H UR: NORMAL G/G CR (ref 0–0.2)
PSA SERPL-ACNC: 0.54 UG/L (ref 0–4)
RBC # BLD AUTO: 4.68 10E12/L (ref 4.4–5.9)
SODIUM SERPL-SCNC: 144 MMOL/L (ref 133–144)
SOURCE: NORMAL
SP GR UR STRIP: 1.01 (ref 1–1.03)
SPECIMEN EXP DATE BLD: NORMAL
SPECIMEN EXP DATE BLD: NORMAL
T PALLIDUM AB SER QL: NONREACTIVE
T4 FREE SERPL-MCNC: 0.89 NG/DL (ref 0.76–1.46)
TIBC SERPL-MCNC: 425 UG/DL (ref 240–430)
TRIGL SERPL-MCNC: 138 MG/DL
TSH SERPL DL<=0.005 MIU/L-ACNC: 12.97 MU/L (ref 0.4–4)
UROBILINOGEN UR STRIP-MCNC: 0 MG/DL (ref 0–2)
WBC # BLD AUTO: 5.7 10E9/L (ref 4–11)

## 2019-10-28 PROCEDURE — 82805 BLOOD GASES W/O2 SATURATION: CPT

## 2019-10-28 PROCEDURE — 85027 COMPLETE CBC AUTOMATED: CPT | Performed by: INTERNAL MEDICINE

## 2019-10-28 PROCEDURE — 85610 PROTHROMBIN TIME: CPT | Performed by: INTERNAL MEDICINE

## 2019-10-28 PROCEDURE — 86481 TB AG RESPONSE T-CELL SUSP: CPT | Performed by: INTERNAL MEDICINE

## 2019-10-28 PROCEDURE — 83050 HGB METHEMOGLOBIN QUAN: CPT

## 2019-10-28 PROCEDURE — 86780 TREPONEMA PALLIDUM: CPT | Performed by: INTERNAL MEDICINE

## 2019-10-28 PROCEDURE — 80048 BASIC METABOLIC PNL TOTAL CA: CPT | Performed by: INTERNAL MEDICINE

## 2019-10-28 PROCEDURE — 40000866 ZZHCL STATISTIC HIV 1/2 ANTIGEN/ANTIBODY PRETRANSPLANT ONLY: Performed by: INTERNAL MEDICINE

## 2019-10-28 PROCEDURE — 36415 COLL VENOUS BLD VENIPUNCTURE: CPT | Performed by: INTERNAL MEDICINE

## 2019-10-28 PROCEDURE — G0103 PSA SCREENING: HCPCS | Performed by: INTERNAL MEDICINE

## 2019-10-28 PROCEDURE — 82306 VITAMIN D 25 HYDROXY: CPT | Performed by: INTERNAL MEDICINE

## 2019-10-28 PROCEDURE — 83550 IRON BINDING TEST: CPT | Performed by: INTERNAL MEDICINE

## 2019-10-28 PROCEDURE — 80061 LIPID PANEL: CPT | Performed by: INTERNAL MEDICINE

## 2019-10-28 PROCEDURE — 86901 BLOOD TYPING SEROLOGIC RH(D): CPT | Performed by: INTERNAL MEDICINE

## 2019-10-28 PROCEDURE — 84443 ASSAY THYROID STIM HORMONE: CPT | Performed by: INTERNAL MEDICINE

## 2019-10-28 PROCEDURE — 81003 URINALYSIS AUTO W/O SCOPE: CPT | Performed by: INTERNAL MEDICINE

## 2019-10-28 PROCEDURE — 86886 COOMBS TEST INDIRECT TITER: CPT | Performed by: INTERNAL MEDICINE

## 2019-10-28 PROCEDURE — 80076 HEPATIC FUNCTION PANEL: CPT | Performed by: INTERNAL MEDICINE

## 2019-10-28 PROCEDURE — 83540 ASSAY OF IRON: CPT | Performed by: INTERNAL MEDICINE

## 2019-10-28 PROCEDURE — 80307 DRUG TEST PRSMV CHEM ANLYZR: CPT | Performed by: INTERNAL MEDICINE

## 2019-10-28 PROCEDURE — 84156 ASSAY OF PROTEIN URINE: CPT | Mod: XU | Performed by: INTERNAL MEDICINE

## 2019-10-28 PROCEDURE — 86905 BLOOD TYPING RBC ANTIGENS: CPT | Performed by: INTERNAL MEDICINE

## 2019-10-28 PROCEDURE — 86644 CMV ANTIBODY: CPT | Performed by: INTERNAL MEDICINE

## 2019-10-28 PROCEDURE — 86665 EPSTEIN-BARR CAPSID VCA: CPT | Performed by: INTERNAL MEDICINE

## 2019-10-28 PROCEDURE — 86850 RBC ANTIBODY SCREEN: CPT | Performed by: INTERNAL MEDICINE

## 2019-10-28 PROCEDURE — 82105 ALPHA-FETOPROTEIN SERUM: CPT | Performed by: INTERNAL MEDICINE

## 2019-10-28 PROCEDURE — 86900 BLOOD TYPING SEROLOGIC ABO: CPT | Performed by: INTERNAL MEDICINE

## 2019-10-28 PROCEDURE — 85018 HEMOGLOBIN: CPT | Mod: 91

## 2019-10-28 PROCEDURE — 82375 ASSAY CARBOXYHB QUANT: CPT

## 2019-10-28 PROCEDURE — 84439 ASSAY OF FREE THYROXINE: CPT | Performed by: INTERNAL MEDICINE

## 2019-10-28 PROCEDURE — 84100 ASSAY OF PHOSPHORUS: CPT | Performed by: INTERNAL MEDICINE

## 2019-10-28 NOTE — PROGRESS NOTES
Liver Transplant Evaluation/Teaching    TEACHING TOPICS: Evaluation Process, Evaluation Items, Diagnostic Studies, Consultation, Chemical Dependency Policy, CD Eval, Donor Source, Liver Allocation, MELD System, UNOS, Waiting List, Follow up while on transplant list, Follow up after transplantation, Infection and Rejection, Immunosuppression , Medication Teaching, Lab Recording after transplant, Laboratory Frequency after transplant , Consent for evaluation and One year survival rates  INSTRUCTIONAL MATERIAL USED/GIVEN: Liver Transplant Handbook, MELD Booklet, Donor Booklet, Web Sites Options, Verbal instructions, Multiple Listing Brochure , Consent for evaluation signed, One year survival rates and SRTR (Scientific Registry) Data  Person(s) involved in teaching: patient, patient's sister and patient's son  Asks Questions: YES  Eager to Learn: YES  Cooperative: YES  Receptive (willing/able to accept information): YES  Reason for the appointment, diagnosis and treatment plan:YES  Knowledge of proper use of medications and conditions for which they are ordered (with special attention to potential side effects or drug interactions): YES  Which situations necessitate calling provider and whom to contact:YES  Other: NA  Teaching Concerns Addressed   Comments: Patient, patient's sister and patient's son attended transplant class. Asked excellent questions. Eval consent signed.   Proper use and care of (medical equip, care aids, etc.):YES  Nutritional needs and diet plan: YES  Pain management techniques: YES  Wound Care: YES  How and/when to access community resources: YES  Patient is aware of and agrees to required commitment to post-op care and long term follow-up: YES  Patient has name and phone number of transplant coordinator.   Time Spent face-to-face teachin minutes.  Bridgett Fritz RN  Liver Transplant Coordinator

## 2019-10-28 NOTE — LETTER
10/28/2019       RE: Charan Pretty  [address]  [facility] [facility] [address]     Dear Colleague,    Thank you for referring your patient, Charan Pretty, to the [facility]ID ORGAN TRANSPLANT at [facility]. Please see a copy of my visit note below.    Liver Transplant Evaluation/Teaching    TEACHING TOPICS: Evaluation Process, Evaluation Items, Diagnostic Studies, Consultation, Chemical Dependency Policy, CD Eval, Donor Source, Liver Allocation, MELD System, UNOS, Waiting List, Follow up while on transplant list, Follow up after transplantation, Infection and Rejection, Immunosuppression , Medication Teaching, Lab Recording after transplant, Laboratory Frequency after transplant , Consent for evaluation and One year survival rates  INSTRUCTIONAL MATERIAL USED/GIVEN: Liver Transplant Handbook, MELD Booklet, Donor Booklet, Web Sites Options, Verbal instructions, Multiple Listing Brochure , Consent for evaluation signed, One year survival rates and SRTR (Scientific Registry) Data  Person(s) involved in teaching: patient, patient's sister and patient's son  Asks Questions: YES  Eager to Learn: YES  Cooperative: YES  Receptive (willing/able to accept information): YES  Reason for the appointment, diagnosis and treatment plan:YES  Knowledge of proper use of medications and conditions for which they are ordered (with special attention to potential side effects or drug interactions): YES  Which situations necessitate calling provider and whom to contact:YES  Other: NA  Teaching Concerns Addressed   Comments: Patient, patient's sister and patient's son attended transplant class. Asked excellent questions. Eval consent signed.   Proper use and care of (medical equip, care aids, etc.):YES  Nutritional needs and diet plan: YES  Pain management techniques: YES  Wound Care: YES  How and/when to access community resources: YES  Patient is aware of and agrees to required commitment  to post-op care and long term follow-up: YES  Patient has name and phone number of transplant coordinator.   Time Spent face-to-face teachin minutes.  Bridgett Fritz RN  Liver Transplant Coordinator         Again, thank you for allowing me to participate in the care of your patient.      Sincerely,    Transplant Class

## 2019-10-29 ENCOUNTER — COMMITTEE REVIEW (OUTPATIENT)
Dept: TRANSPLANT | Facility: CLINIC | Age: 64
End: 2019-10-29

## 2019-10-29 ENCOUNTER — ALLIED HEALTH/NURSE VISIT (OUTPATIENT)
Dept: TRANSPLANT | Facility: CLINIC | Age: 64
End: 2019-10-29
Attending: INTERNAL MEDICINE
Payer: COMMERCIAL

## 2019-10-29 ENCOUNTER — OFFICE VISIT (OUTPATIENT)
Dept: TRANSPLANT | Facility: CLINIC | Age: 64
End: 2019-10-29
Attending: TRANSPLANT SURGERY
Payer: COMMERCIAL

## 2019-10-29 ENCOUNTER — OFFICE VISIT (OUTPATIENT)
Dept: GASTROENTEROLOGY | Facility: CLINIC | Age: 64
End: 2019-10-29
Attending: INTERNAL MEDICINE
Payer: COMMERCIAL

## 2019-10-29 VITALS
HEART RATE: 80 BPM | WEIGHT: 230.9 LBS | SYSTOLIC BLOOD PRESSURE: 150 MMHG | HEIGHT: 66 IN | BODY MASS INDEX: 37.11 KG/M2 | DIASTOLIC BLOOD PRESSURE: 66 MMHG

## 2019-10-29 DIAGNOSIS — R18.8 CIRRHOSIS OF LIVER WITH ASCITES, UNSPECIFIED HEPATIC CIRRHOSIS TYPE (H): ICD-10-CM

## 2019-10-29 DIAGNOSIS — K76.81: ICD-10-CM

## 2019-10-29 DIAGNOSIS — K76.6 PORTAL HYPERTENSION (H): ICD-10-CM

## 2019-10-29 DIAGNOSIS — K75.81 NASH (NONALCOHOLIC STEATOHEPATITIS): ICD-10-CM

## 2019-10-29 DIAGNOSIS — K74.60 CIRRHOSIS OF LIVER WITH ASCITES, UNSPECIFIED HEPATIC CIRRHOSIS TYPE (H): Primary | ICD-10-CM

## 2019-10-29 DIAGNOSIS — R06.00 DYSPNEA: ICD-10-CM

## 2019-10-29 DIAGNOSIS — K74.60 CIRRHOSIS OF LIVER WITH ASCITES, UNSPECIFIED HEPATIC CIRRHOSIS TYPE (H): ICD-10-CM

## 2019-10-29 DIAGNOSIS — Z76.82 AWAITING ORGAN TRANSPLANT STATUS: Primary | ICD-10-CM

## 2019-10-29 DIAGNOSIS — R18.8 CIRRHOSIS OF LIVER WITH ASCITES, UNSPECIFIED HEPATIC CIRRHOSIS TYPE (H): Primary | ICD-10-CM

## 2019-10-29 DIAGNOSIS — K74.60 CIRRHOSIS OF LIVER (H): Primary | ICD-10-CM

## 2019-10-29 LAB
ETHYL GLUCURONIDE UR QL: NEGATIVE
GAMMA INTERFERON BACKGROUND BLD IA-ACNC: 0.07 IU/ML
INTERPRETATION ECG - MUSE: NORMAL
M TB IFN-G BLD-IMP: NEGATIVE
M TB IFN-G CD4+ BCKGRND COR BLD-ACNC: >10 IU/ML
MITOGEN IGNF BCKGRD COR BLD-ACNC: 0 IU/ML
MITOGEN IGNF BCKGRD COR BLD-ACNC: 0 IU/ML

## 2019-10-29 PROCEDURE — 97802 MEDICAL NUTRITION INDIV IN: CPT | Mod: ZF | Performed by: DIETITIAN, REGISTERED

## 2019-10-29 PROCEDURE — G0463 HOSPITAL OUTPT CLINIC VISIT: HCPCS | Mod: ZF

## 2019-10-29 RX ORDER — QUINIDINE GLUCONATE 324 MG
325 TABLET, EXTENDED RELEASE ORAL 2 TIMES DAILY
COMMUNITY

## 2019-10-29 ASSESSMENT — MIFFLIN-ST. JEOR: SCORE: 1785.11

## 2019-10-29 NOTE — PROGRESS NOTES
Psychosocial Assessment for Liver Transplant Evaluation  Charan Pretty was seen in the Transplant Center as part of his evaluation as a potential liver transplant recipient.  He was accompanied by his sister Norbert, daughter Jahaira and son Farhat.  Living Situation: Charan lives in a house in [place], [place].  His son Farhat moved in with him one year ago after going through a divorce.  Farhat is going through legal problems and there is a possibility of incarceration.  Education/Employment:  Charan graduated high school. He is not a .  He has worked as a  for the [employer]ga for thirty-two year.  Charan has not been able to work since June 8, 2019 when he was hospitalized.  He does not anticipate being able to return to work.  Financial /Income: Charan has short-term and long-term disability benefits through his employer.  He is currently receiving long-term disability benefits, and is in the process of applying for JOHNATHON benefits.    Health Insurance:  [employer], through his employer [employer] [facility] [employer].  Patient is not sure when this policy will transition to COBRA.  He has a meeting scheduled with his employer Human Resources tomorrow.  Charan believes the cost of COBRA coverage will be over $800/month, and he has some concern over the cost.   I have also asked Ann Bell, Live Transplant Case Manager to contact patient for additional education and guidance.   This writer talked with Charan and his family about the financial risks of transplant, particularly about the high cost of transplant related medications and the importance of maintaining adequate health insurance coverage.  Family/Social Support: Charan has been  since 1999.  He is not currently in a relationship.  He has two adult children who are present today and demonstrating their support of patient.  Jahaira lives in [place], [place].  She also works just two miles away from  "where patient resides.  Charan's son Farhat lives with him as previously mentioned.  Charan's parents are .  He has nine siblings, and he is quite close with all of them.  His sister Norbert, present today, is the most involved.  She lives in [place], [place].  This writer stressed the importance of having a stable and involved support network before and after transplant.  Provided Charan and his family with education about the relationship between a stable support system and better surgical and post-transplant outcomes compared to patients with a limited support system.    Functional Status: Charan ambulates upwards of two miles with oxygen.    Chemical Dependency:  Charan quit smoking over thirty-five years ago.  He reports a history of using marijuana but not in forty years.    Charan has a history of alcohol abuse.  He describs himself as a \"weekend alcoholic,\" where he would consume upwards of twenty drinks one night of the weekend.  Charan had this behavior for about seven years, and at the age of [age] he completed outpatient chemical dependency treatment.  Charan reports he was completely sober from ages [age] to age [age].  He relapsed to drinking approximately two drinks per week for the past three years.  Charan denies any alcohol consumption since May of 2019.  Charan completed a chemical dependency evaluation on 10/11/19 at [facility], and there were no recommendations for treatment.  Mental Health: Charan has a diagnosis of depression by history.  He was prescribed an anti-depressant medication in  around the time of his divorce.  He and his ex-wife also attended marriage counseling.  Charan reports having suicidal ideation around this time, though he denies having a plan or attempt.   Charan denies any recent suicidal ideation.  Charan has no history of hospitalization for mental health treatment.  PHQ-9 score today: 1  CHRISTINA-7 score today: 5  Adjustment to Illness:  Charan is coping quite " well with the progression of his illness. He feels very supported by his family.    This writer provided Charan and family with supportive counseling throughout this interview.  This writer also encouraged Charan and his family to attend the liver transplant support group for additional support and encouragement.   Impression/Recommendations:   Charan, his sister and adult children verbalize understanding the psychosocial risks of transplant and teaching provided during this evaluation.  Charan discontinued all alcohol consumption in May of this year.  His consumption had been two drinks per week for the past three years, and prior to that he had been sober since the age of [age].  He completed a chemical dependency assessment earlier this month, and there were no treatment recommendations.   His mental health is stable, and he is coping appropriately.  Charan has adequate finances and health insurance for transplant, a stable living situation and an involved  support system.  The Caregiver Agreement for Liver Transplant was discussed with patient and his family.  His daughter, son and sister are all willing to be a part of his care giver plan.  Charan is an acceptable transplant candidate from a psychosocial perspective.  This writer will remain available to assist patient throughout the evaluation process and will follow patient through transplant if he is listed.  It was a pleasure to evaluate this patient for liver transplant.   Teaching completed during assessment:  1.     Housing and relocation needs post transplant.  2.     Caregiver needs post transplant.  3.     Financial issues related to transplant.  4.     Risks of alcohol use post transplant.  5.     Common psychosocial stressors pre/post transplant.          6.     Liver Transplant support group availability.          7.     [facility] Directive-form and education provided             Psychosocial Risks of Transplant Reviewed:  1.     Increased  stress related to your emotional, family, social, employment, or   financial situation.  2.     Affect on work and/or disability benefits.  3.     Affect on future health and life insurance.  4.     Transplant outcome expectations may not be met.  5.     Mental Health risks: anxiety, depression, PTSD, guilt, grief and chronic fatigue.     LOUISE Nava

## 2019-10-29 NOTE — PROGRESS NOTES
SUBJECTIVE:  I had the pleasure of seeing Charan Pretty for consultation in the Liver Transplant Clinic at the [facility] on 10/29/2019.  Mr. Pretty presents for his pretransplant evaluation.      He has been diagnosed with cirrhosis, most likely on the basis of nonalcoholic steatohepatitis.  He has been working hard to lose weight and has done so.  He was hospitalized recently at [facility] with respiratory failure.  He had an evaluation at that time which included an echocardiogram that showed a positive bubble study for either intracardiac shunt or perhaps an intrapulmonary shunt.  He has been on home O2 since because he desaturates when he is out walking.      In terms of his other complications related liver disease.  He has had ascites in the past.  He has not had any gastrointestinal bleeding or any overt signs of hepatic encephalopathy.      At present, he feels fairly well.  He denies any abdominal pain, itching or skin rash.  He has mild fatigue.  He denies any increased abdominal girth or lower extremity edema.  He denies any fevers or chills.  He does have a cough and as I mentioned, he has dyspnea on exertion.  He does desaturate when that happens.  He denies any nausea, vomiting, diarrhea or constipation.  His appetite has been good and he is trying to lose some weight.      PAST MEDICAL HISTORY:  His past medical history is significant for previous foot surgery.  He also has had a lens implant.  He did have a diagnosis of hypertension, but not now.  He also has reported to have a heart murmur.      SOCIAL HISTORY:  He does not use any alcohol or tobacco.  He came to clinic appointment with his wife and 2 children.      FAMILY HISTORY:  There is no family history of liver disease.      REVIEW OF SYSTEMS:  A complete review of systems was negative other than that noted above.     Current Outpatient Medications   Medication     Ferrous Gluconate 240 (27 Fe) MG  TABS     furosemide (LASIX) 40 MG tablet     levothyroxine (SYNTHROID/LEVOTHROID) 25 MCG tablet     Metoprolol Succinate (TOPROL XL PO)     pantoprazole (PROTONIX) 40 MG EC tablet     potassium chloride ER (K-DUR/KLOR-CON M) 10 MEQ CR tablet     No current facility-administered medications for this visit.        OBJECTIVE:     GENERAL:  On physical exam his weight is 231 pounds.  His blood pressure is 150/66.  Pulse is 90.  His O2 saturation on room air is 96%.  In general, he actually looks relatively well.     HEENT EXAM:  Shows no scleral icterus or temporal muscle wasting.     CHEST:  Clear.     ABDOMINAL EXAM:  Shows no increase in girth.  No masses or tenderness to palpation are present.   CARDIAC EXAM:  Reveals no S3, S4 or murmur.   ABDOMINAL EXAM:  Shows no increase in girth.  No masses or tenderness to palpation are present.  His liver is 10 cm in span without left lobe enlargement.  No spleen tip is palpable.     EXTREMITY EXAM:  Shows no edema.     SKIN EXAM:  Shows no stigmata of chronic liver disease.     NEUROLOGIC EXAM:  Shows no asterixis.     Recent Results (from the past 168 hour(s))   Treponema Abs w Reflex to RPR and Titer    Collection Time: 10/28/19  6:24 AM   Result Value Ref Range    Treponema Antibodies Nonreactive NR^Nonreactive   HIV Antigen Antibody Combo Pretransplant    Collection Time: 10/28/19  6:24 AM   Result Value Ref Range    HIV Antigen Antibody Combo Pretransplant Nonreactive NR^Nonreactive   EBV Capsid Antibody IgG    Collection Time: 10/28/19  6:24 AM   Result Value Ref Range    EBV Capsid Antibody IgG >8.0 (H) 0.0 - 0.8 AI   CMV Antibody IgG    Collection Time: 10/28/19  6:24 AM   Result Value Ref Range    CMV Antibody IgG >8.0 (H) 0.0 - 0.8 AI   CBC with platelets    Collection Time: 10/28/19  6:24 AM   Result Value Ref Range    WBC 5.7 4.0 - 11.0 10e9/L    RBC Count 4.68 4.4 - 5.9 10e12/L    Hemoglobin 13.9 13.3 - 17.7 g/dL    Hematocrit 43.5 40.0 - 53.0 %    MCV 93 78 -  100 fl    MCH 29.7 26.5 - 33.0 pg    MCHC 32.0 31.5 - 36.5 g/dL    RDW 13.6 10.0 - 15.0 %    Platelet Count 69 (L) 150 - 450 10e9/L   INR    Collection Time: 10/28/19  6:24 AM   Result Value Ref Range    INR 1.19 (H) 0.86 - 1.14   TSH with free T4 reflex    Collection Time: 10/28/19  6:24 AM   Result Value Ref Range    TSH 12.97 (H) 0.40 - 4.00 mU/L   Vitamin D Deficiency    Collection Time: 10/28/19  6:24 AM   Result Value Ref Range    Vitamin D Deficiency screening 22 20 - 75 ug/L   Prostate spec antigen screen    Collection Time: 10/28/19  6:24 AM   Result Value Ref Range    PSA 0.54 0 - 4 ug/L   Quantiferon TB Gold Plus    Collection Time: 10/28/19  6:24 AM   Result Value Ref Range    Quantiferon-TB Gold Plus Result Negative NEG^Negative    TB1 Ag minus Nil Value 0.00 IU/mL    TB2 Ag minus Nil Value 0.00 IU/mL    Mitogen minus Nil Result >10.00 IU/mL    Nil Result 0.07 IU/mL   Phosphorus    Collection Time: 10/28/19  6:24 AM   Result Value Ref Range    Phosphorus 3.4 2.5 - 4.5 mg/dL   Iron and iron binding capacity    Collection Time: 10/28/19  6:24 AM   Result Value Ref Range    Iron 142 35 - 180 ug/dL    Iron Binding Cap 425 240 - 430 ug/dL    Iron Saturation Index 34 15 - 46 %   AFP tumor marker    Collection Time: 10/28/19  6:24 AM   Result Value Ref Range    Alpha Fetoprotein <1.5 0 - 8 ug/L   Basic metabolic panel    Collection Time: 10/28/19  6:24 AM   Result Value Ref Range    Sodium 144 133 - 144 mmol/L    Potassium 3.8 3.4 - 5.3 mmol/L    Chloride 111 (H) 94 - 109 mmol/L    Carbon Dioxide 28 20 - 32 mmol/L    Anion Gap 6 3 - 14 mmol/L    Glucose 102 (H) 70 - 99 mg/dL    Urea Nitrogen 21 7 - 30 mg/dL    Creatinine 0.92 0.66 - 1.25 mg/dL    GFR Estimate 88 >60 mL/min/[1.73_m2]    GFR Estimate If Black >90 >60 mL/min/[1.73_m2]    Calcium 8.9 8.5 - 10.1 mg/dL   Hepatic panel    Collection Time: 10/28/19  6:24 AM   Result Value Ref Range    Bilirubin Direct 0.3 (H) 0.0 - 0.2 mg/dL    Bilirubin Total 0.8 0.2  - 1.3 mg/dL    Albumin 3.6 3.4 - 5.0 g/dL    Protein Total 7.7 6.8 - 8.8 g/dL    Alkaline Phosphatase 104 40 - 150 U/L    ALT 59 0 - 70 U/L    AST 47 (H) 0 - 45 U/L   Lipid Profile    Collection Time: 10/28/19  6:24 AM   Result Value Ref Range    Cholesterol 153 <200 mg/dL    Triglycerides 138 <150 mg/dL    HDL Cholesterol 45 >39 mg/dL    LDL Cholesterol Calculated 80 <100 mg/dL    Non HDL Cholesterol 107 <130 mg/dL   ABO/Rh type and screen    Collection Time: 10/28/19  6:24 AM   Result Value Ref Range    ABO O     RH(D) Pos     Antibody Screen Neg     Test Valid Only At          [facility]    Specimen Expires 10/31/2019    ABO Subtyping [[id number]]    Collection Time: 10/28/19  6:24 AM   Result Value Ref Range    Antigen Type Canceled, Test credited     Blood Bank Comment       Patient not ABO type A or AB. A subtyping not applicable. 10/28/19 LH   T4 free    Collection Time: 10/28/19  6:24 AM   Result Value Ref Range    T4 Free 0.89 0.76 - 1.46 ng/dL   Antibody titer red cell [[id number]]    Collection Time: 10/28/19  6:31 AM   Result Value Ref Range    Antibody Titer Anti A: IgG >256  Anti B: IgG >256      ABO type [[id number]]    Collection Time: 10/28/19  6:32 AM   Result Value Ref Range    ABO O     RH(D) Pos     Specimen Expires 10/31/2019    Protein  random urine with Creat Ratio    Collection Time: 10/28/19  6:39 AM   Result Value Ref Range    Protein Random Urine <0.05 g/L    Protein Total Urine g/gr Creatinine Unable to calculate due to low value 0 - 0.2 g/g Cr   Ethyl Glucuronide Urine    Collection Time: 10/28/19  6:39 AM   Result Value Ref Range    Ethyl Glucuronide Urine Negative      UA reflex to Microscopic and Culture    Collection Time: 10/28/19  6:39 AM   Result Value Ref Range    Color Urine Straw     Appearance Urine Clear     Glucose Urine Negative NEG^Negative mg/dL    Bilirubin Urine Negative NEG^Negative    Ketones Urine Negative NEG^Negative mg/dL     Specific Gravity Urine 1.006 1.003 - 1.035    Blood Urine Negative NEG^Negative    pH Urine 6.0 5.0 - 7.0 pH    Protein Albumin Urine Negative NEG^Negative mg/dL    Urobilinogen mg/dL 0.0 0.0 - 2.0 mg/dL    Nitrite Urine Negative NEG^Negative    Leukocyte Esterase Urine Negative NEG^Negative    Source Midstream Urine    Creatinine urine calculation only    Collection Time: 10/28/19  6:39 AM   Result Value Ref Range    Creatinine Urine 12 mg/dL   EKG 12-lead, tracing only [EKG1]    Collection Time: 10/28/19  6:48 AM   Result Value Ref Range    Interpretation ECG Click View Image link to view waveform and result    General PFT Lab (Please always keep checked)    Collection Time: 10/28/19  8:27 AM   Result Value Ref Range    FIO2-Pre 21.00 %   Blood gas arterial and oxyhgb    Collection Time: 10/28/19  8:36 AM   Result Value Ref Range    pH Arterial 7.44 7.35 - 7.45 pH    pCO2 Arterial 36 35 - 45 mm Hg    pO2 Arterial 84 80 - 105 mm Hg    Bicarbonate Arterial 24 21 - 28 mmol/L    FIO2 21     Oxyhemoglobin Arterial 96 92 - 100 %    Base Excess Art 0.9 mmol/L   Carbon monoxide    Collection Time: 10/28/19  8:36 AM   Result Value Ref Range    Carbon Monoxide 1.0 0 - 2 %   Hemoglobin    Collection Time: 10/28/19  8:36 AM   Result Value Ref Range    Hemoglobin 13.8 13.3 - 17.7 g/dL   Methemoglobin    Collection Time: 10/28/19  8:36 AM   Result Value Ref Range    Methemoglobin 0.0 0 - 3 %      IMPRESSION:  My impression is that Mr. Pretty has well-compensated cirrhosis caused by nonalcoholic fatty liver disease.  His current MELD score is 8.  The principal reason why he was sent over here was the question of hepatopulmonary syndrome, and given his room air pO2 was 84, I really wonder if that is going to be the case.  We will have him seen by Pulmonary and he also will see Cardiology here to better sort this out.  His echocardiogram did suggest that he had an intracardiac shunt, but this can easily be confused with a  intrapulmonary shunt if one is not looking for HPS.      I did spend more than 50% of our 60-minute visit discussing with them what hepatopulmonary syndrome is and what the expected results of a liver transplantation of these circumstances.  I also discussed live donor liver transplantation with them and also did point out that it is not clear if liver transplantation is going to be indicated.  Clearly, we are not going to go forward with liver transplant if we do not think it is going to improve his quality of life.  Otherwise, he is up to date with regard to cancer screening and vaccinations.  We will discuss him at our multidisciplinary liver transplant selection conference this afternoon.      Thank you very much for allowing me to participate in the care of this patient.  If you have any questions regarding my recommendations, please do not hesitate to contact me.       Vinay Sanchez MD      Professor of Medicine  [employer]      Executive Medical Director, Solid Organ Transplant Program  [facility]

## 2019-10-29 NOTE — LETTER
10/29/2019       RE: Charan Pretty  [address]  [facility] [address]     Dear Colleague,    Thank you for referring your patient, Charan Pretty, to the [facility] HEPATOLOGY at [facility]. Please see a copy of my visit note below.    SUBJECTIVE:  I had the pleasure of seeing Charan Pretty for consultation in the Liver Transplant Clinic at the [facility] on 10/29/2019.  Mr. Pretty presents for his pretransplant evaluation.      He has been diagnosed with cirrhosis, most likely on the basis of nonalcoholic steatohepatitis.  He has been working hard to lose weight and has done so.  He was hospitalized recently at [facility] with respiratory failure.  He had an evaluation at that time which included an echocardiogram that showed a positive bubble study for either intracardiac shunt or perhaps an intrapulmonary shunt.  He has been on home O2 since because he desaturates when he is out walking.      In terms of his other complications related liver disease.  He has had ascites in the past.  He has not had any gastrointestinal bleeding or any overt signs of hepatic encephalopathy.      At present, he feels fairly well.  He denies any abdominal pain, itching or skin rash.  He has mild fatigue.  He denies any increased abdominal girth or lower extremity edema.  He denies any fevers or chills.  He does have a cough and as I mentioned, he has dyspnea on exertion.  He does desaturate when that happens.  He denies any nausea, vomiting, diarrhea or constipation.  His appetite has been good and he is trying to lose some weight.      PAST MEDICAL HISTORY:  His past medical history is significant for previous foot surgery.  He also has had a lens implant.  He did have a diagnosis of hypertension, but not now.  He also has reported to have a heart murmur.      SOCIAL HISTORY:  He does not use any alcohol or tobacco.  He came to clinic  appointment with his wife and 2 children.      FAMILY HISTORY:  There is no family history of liver disease.      REVIEW OF SYSTEMS:  A complete review of systems was negative other than that noted above.     Current Outpatient Medications   Medication     Ferrous Gluconate 240 (27 Fe) MG TABS     furosemide (LASIX) 40 MG tablet     levothyroxine (SYNTHROID/LEVOTHROID) 25 MCG tablet     Metoprolol Succinate (TOPROL XL PO)     pantoprazole (PROTONIX) 40 MG EC tablet     potassium chloride ER (K-DUR/KLOR-CON M) 10 MEQ CR tablet     No current facility-administered medications for this visit.        OBJECTIVE:     GENERAL:  On physical exam his weight is 231 pounds.  His blood pressure is 150/66.  Pulse is 90.  His O2 saturation on room air is 96%.  In general, he actually looks relatively well.     HEENT EXAM:  Shows no scleral icterus or temporal muscle wasting.     CHEST:  Clear.     ABDOMINAL EXAM:  Shows no increase in girth.  No masses or tenderness to palpation are present.   CARDIAC EXAM:  Reveals no S3, S4 or murmur.   ABDOMINAL EXAM:  Shows no increase in girth.  No masses or tenderness to palpation are present.  His liver is 10 cm in span without left lobe enlargement.  No spleen tip is palpable.     EXTREMITY EXAM:  Shows no edema.     SKIN EXAM:  Shows no stigmata of chronic liver disease.     NEUROLOGIC EXAM:  Shows no asterixis.     Recent Results (from the past 168 hour(s))   Treponema Abs w Reflex to RPR and Titer    Collection Time: 10/28/19  6:24 AM   Result Value Ref Range    Treponema Antibodies Nonreactive NR^Nonreactive   HIV Antigen Antibody Combo Pretransplant    Collection Time: 10/28/19  6:24 AM   Result Value Ref Range    HIV Antigen Antibody Combo Pretransplant Nonreactive NR^Nonreactive   EBV Capsid Antibody IgG    Collection Time: 10/28/19  6:24 AM   Result Value Ref Range    EBV Capsid Antibody IgG >8.0 (H) 0.0 - 0.8 AI   CMV Antibody IgG    Collection Time: 10/28/19  6:24 AM   Result  Value Ref Range    CMV Antibody IgG >8.0 (H) 0.0 - 0.8 AI   CBC with platelets    Collection Time: 10/28/19  6:24 AM   Result Value Ref Range    WBC 5.7 4.0 - 11.0 10e9/L    RBC Count 4.68 4.4 - 5.9 10e12/L    Hemoglobin 13.9 13.3 - 17.7 g/dL    Hematocrit 43.5 40.0 - 53.0 %    MCV 93 78 - 100 fl    MCH 29.7 26.5 - 33.0 pg    MCHC 32.0 31.5 - 36.5 g/dL    RDW 13.6 10.0 - 15.0 %    Platelet Count 69 (L) 150 - 450 10e9/L   INR    Collection Time: 10/28/19  6:24 AM   Result Value Ref Range    INR 1.19 (H) 0.86 - 1.14   TSH with free T4 reflex    Collection Time: 10/28/19  6:24 AM   Result Value Ref Range    TSH 12.97 (H) 0.40 - 4.00 mU/L   Vitamin D Deficiency    Collection Time: 10/28/19  6:24 AM   Result Value Ref Range    Vitamin D Deficiency screening 22 20 - 75 ug/L   Prostate spec antigen screen    Collection Time: 10/28/19  6:24 AM   Result Value Ref Range    PSA 0.54 0 - 4 ug/L   Quantiferon TB Gold Plus    Collection Time: 10/28/19  6:24 AM   Result Value Ref Range    Quantiferon-TB Gold Plus Result Negative NEG^Negative    TB1 Ag minus Nil Value 0.00 IU/mL    TB2 Ag minus Nil Value 0.00 IU/mL    Mitogen minus Nil Result >10.00 IU/mL    Nil Result 0.07 IU/mL   Phosphorus    Collection Time: 10/28/19  6:24 AM   Result Value Ref Range    Phosphorus 3.4 2.5 - 4.5 mg/dL   Iron and iron binding capacity    Collection Time: 10/28/19  6:24 AM   Result Value Ref Range    Iron 142 35 - 180 ug/dL    Iron Binding Cap 425 240 - 430 ug/dL    Iron Saturation Index 34 15 - 46 %   AFP tumor marker    Collection Time: 10/28/19  6:24 AM   Result Value Ref Range    Alpha Fetoprotein <1.5 0 - 8 ug/L   Basic metabolic panel    Collection Time: 10/28/19  6:24 AM   Result Value Ref Range    Sodium 144 133 - 144 mmol/L    Potassium 3.8 3.4 - 5.3 mmol/L    Chloride 111 (H) 94 - 109 mmol/L    Carbon Dioxide 28 20 - 32 mmol/L    Anion Gap 6 3 - 14 mmol/L    Glucose 102 (H) 70 - 99 mg/dL    Urea Nitrogen 21 7 - 30 mg/dL    Creatinine  0.92 0.66 - 1.25 mg/dL    GFR Estimate 88 >60 mL/min/[1.73_m2]    GFR Estimate If Black >90 >60 mL/min/[1.73_m2]    Calcium 8.9 8.5 - 10.1 mg/dL   Hepatic panel    Collection Time: 10/28/19  6:24 AM   Result Value Ref Range    Bilirubin Direct 0.3 (H) 0.0 - 0.2 mg/dL    Bilirubin Total 0.8 0.2 - 1.3 mg/dL    Albumin 3.6 3.4 - 5.0 g/dL    Protein Total 7.7 6.8 - 8.8 g/dL    Alkaline Phosphatase 104 40 - 150 U/L    ALT 59 0 - 70 U/L    AST 47 (H) 0 - 45 U/L   Lipid Profile    Collection Time: 10/28/19  6:24 AM   Result Value Ref Range    Cholesterol 153 <200 mg/dL    Triglycerides 138 <150 mg/dL    HDL Cholesterol 45 >39 mg/dL    LDL Cholesterol Calculated 80 <100 mg/dL    Non HDL Cholesterol 107 <130 mg/dL   ABO/Rh type and screen    Collection Time: 10/28/19  6:24 AM   Result Value Ref Range    ABO O     RH(D) Pos     Antibody Screen Neg     Test Valid Only At          [facility]    Specimen Expires 10/31/2019    ABO Subtyping [[id number]]    Collection Time: 10/28/19  6:24 AM   Result Value Ref Range    Antigen Type Canceled, Test credited     Blood Bank Comment       Patient not ABO type A or AB. A subtyping not applicable. 10/28/19 LH   T4 free    Collection Time: 10/28/19  6:24 AM   Result Value Ref Range    T4 Free 0.89 0.76 - 1.46 ng/dL   Antibody titer red cell [[id number]]    Collection Time: 10/28/19  6:31 AM   Result Value Ref Range    Antibody Titer Anti A: IgG >256  Anti B: IgG >256      ABO type [[id number]]    Collection Time: 10/28/19  6:32 AM   Result Value Ref Range    ABO O     RH(D) Pos     Specimen Expires 10/31/2019    Protein  random urine with Creat Ratio    Collection Time: 10/28/19  6:39 AM   Result Value Ref Range    Protein Random Urine <0.05 g/L    Protein Total Urine g/gr Creatinine Unable to calculate due to low value 0 - 0.2 g/g Cr   Ethyl Glucuronide Urine    Collection Time: 10/28/19  6:39 AM   Result Value Ref Range    Ethyl Glucuronide Urine  Negative      UA reflex to Microscopic and Culture    Collection Time: 10/28/19  6:39 AM   Result Value Ref Range    Color Urine Straw     Appearance Urine Clear     Glucose Urine Negative NEG^Negative mg/dL    Bilirubin Urine Negative NEG^Negative    Ketones Urine Negative NEG^Negative mg/dL    Specific Gravity Urine 1.006 1.003 - 1.035    Blood Urine Negative NEG^Negative    pH Urine 6.0 5.0 - 7.0 pH    Protein Albumin Urine Negative NEG^Negative mg/dL    Urobilinogen mg/dL 0.0 0.0 - 2.0 mg/dL    Nitrite Urine Negative NEG^Negative    Leukocyte Esterase Urine Negative NEG^Negative    Source Midstream Urine    Creatinine urine calculation only    Collection Time: 10/28/19  6:39 AM   Result Value Ref Range    Creatinine Urine 12 mg/dL   EKG 12-lead, tracing only [EKG1]    Collection Time: 10/28/19  6:48 AM   Result Value Ref Range    Interpretation ECG Click View Image link to view waveform and result    General PFT Lab (Please always keep checked)    Collection Time: 10/28/19  8:27 AM   Result Value Ref Range    FIO2-Pre 21.00 %   Blood gas arterial and oxyhgb    Collection Time: 10/28/19  8:36 AM   Result Value Ref Range    pH Arterial 7.44 7.35 - 7.45 pH    pCO2 Arterial 36 35 - 45 mm Hg    pO2 Arterial 84 80 - 105 mm Hg    Bicarbonate Arterial 24 21 - 28 mmol/L    FIO2 21     Oxyhemoglobin Arterial 96 92 - 100 %    Base Excess Art 0.9 mmol/L   Carbon monoxide    Collection Time: 10/28/19  8:36 AM   Result Value Ref Range    Carbon Monoxide 1.0 0 - 2 %   Hemoglobin    Collection Time: 10/28/19  8:36 AM   Result Value Ref Range    Hemoglobin 13.8 13.3 - 17.7 g/dL   Methemoglobin    Collection Time: 10/28/19  8:36 AM   Result Value Ref Range    Methemoglobin 0.0 0 - 3 %      IMPRESSION:  My impression is that Mr. Pretty has well-compensated cirrhosis caused by nonalcoholic fatty liver disease.  His current MELD score is 8.  The principal reason why he was sent over here was the question of hepatopulmonary  syndrome, and given his room air pO2 was 84, I really wonder if that is going to be the case.  We will have him seen by Pulmonary and he also will see Cardiology here to better sort this out.  His echocardiogram did suggest that he had an intracardiac shunt, but this can easily be confused with a intrapulmonary shunt if one is not looking for HPS.      I did spend more than 50% of our 60-minute visit discussing with them what hepatopulmonary syndrome is and what the expected results of a liver transplantation of these circumstances.  I also discussed live donor liver transplantation with them and also did point out that it is not clear if liver transplantation is going to be indicated.  Clearly, we are not going to go forward with liver transplant if we do not think it is going to improve his quality of life.  Otherwise, he is up to date with regard to cancer screening and vaccinations.  We will discuss him at our multidisciplinary liver transplant selection conference this afternoon.      Thank you very much for allowing me to participate in the care of this patient.  If you have any questions regarding my recommendations, please do not hesitate to contact me.       Vinay Sanchez MD      Professor of Medicine  [employer]  Executive Medical Director, Solid Organ Transplant Program  [facility]

## 2019-10-29 NOTE — PROGRESS NOTES
"Outpatient MNT: Liver Transplant Evaluation    Current BMI: 36.6 (HT 66 in,  lbs/103 kg)  BMI is within criteria of <40 for liver transplant     Time Spent: 15 minutes  Visit Type: Initial  Referring Physician: Anil   Pt accompanied by: his son, daughter, and sister     Medical dx associated with RD referral  - Cirrhosis 2/2 MENDOZA/DENISE    History of previous txp: none    Nutrition Assessment  Pt reports his son does most of the cooking for him. They all read labels for sodium content, aiming for <2000 mg/day. They use Mrs Medeiros or [employer]'s no salt substitute. He has been following a low sodium diet for 1-2 months and he is doing well.     Appetite: good/baseline     Vitamins, Supplements, Pertinent Meds: MVI, iron   Herbal Medicines/Supplements: none     Diet Recall  Breakfast Cereal with fruit    Lunch Deli meat s/w (compared labels and purchased lower sodium) with fruit   Dinner Lean burger or grilled chicken with baked potato and frozen peas/mixed veggies   Snacks Baked chips, fruit (previously more junk food)   Beverages 80 oz fluid restriction-->8 oz juice/day, 8 oz skim milk, crystal light/water; prior to June was drinking 12 pop/day    Dining out [employer] chicken breast 6\" 1x/week or less      Physical Activity  Walks outside a few times/week  He may look into getting portable oxygen for walking outside in the winter      Anthropometrics  Height:   66 in   BMI:    36.6    Weight Status:Obesity Grade II BMI 35-39.9   Weight:  227 lbs            IBW (lb): 142  % IBW: 160    Wt Hx: Pt reports no fluid retention. He gained water weight and now he is back near his baseline. No muscle loss noted.     Adj/dosing BW: 163 lbs/74 kg         Malnutrition  % Intake: No decreased intake noted  % Weight Loss: None noted  Subcutaneous Fat Loss: None  Muscle Loss: None  Fluid Accumulation/Edema: None noted  Malnutrition Diagnosis: Patient does not meet two of the above criteria necessary for diagnosing malnutrition "     Estimated Nutrition Needs  Energy  [id number]     (25-30 kcal/kg for maintenance)   Protein  59-74    (0.8-1 g/kg for maintenance)           Fluid  1 ml/kcal or per MD        Micronutrient   Na+: <2000 mg/day            Nutrition Diagnosis  Food and nutrition related knowledge deficit r/t pre liver transplant eval AEB pt verbalized not hearing pre/post transplant diet guidelines.    Nutrition Intervention  Nutrition education provided:  Discussed sodium intake (low sodium foods and drinks, seasoning food without salt and tips for low sodium diet). Pt doing well overall with low sodium diet and has no questions or concerns.     Reviewed adequate protein intake. Encouraged receiving protein from both animal and plant based sources.     Discussed significant dietary changes pt has made thus far to reduce risk of fatty liver after transplant. Discussed importance of continuing these modifications after transplant.     Reviewed post txp diet guidelines in brief (will review in further detail post txp):  (1) Review of proper food safety measures d/t immunosuppressant therapy post-op and increased risk for food-borne illness    (2) Avoid the following post txp d/t risk for rejection, unknown effects on the organs, and/or potential interactions with immunosuppressants:  - Herbal, Chinese, holistic, chiropractic, natural, alternative medicines and supplements  - Detoxes and cleanses  - Weight loss pills  - Protein powders or other products with extracts or herbs (ie green tea extract)    (3) Med regimen and possible side effects    Patient Understanding: Pt verbalized understanding of education provided.  Expected Compliance: Good  Follow-Up Plans: PRN     Nutrition Goals  1. Limit Na+ <2000mg/day  2. Pt to verbalize understanding of 3 aspects of post txp education provided    Provided pt with contact info.   Sera Masters RD, LD  Pgr [phone]

## 2019-10-29 NOTE — PROGRESS NOTES
HPI      ROS      Physical Exam    Assessment and Plan:  1. liver transplant evaluation - patient is a good candidate overall. Benefits and surgical risks of a liver transplantation were discussed.  2.  End stage liver disease due to Laennec's    Surgical evaluation:  1. Portal Vein:Patent  2. Hepatic Artery: Open  3. TIPS: absent  4. Previous Abdominal Surgery: No  5. Hepatocellular Carcinoma: None  6. Ascites: Present - minimal  7. Costal Angle: wide  8. Portopulmonary Hypertension: absent  9. 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 Cardiac Evaluation: needs stress echocardiogram  11. Nutritional Status: Good  12. Diabetes: no  13.Hypertension yes   14. Smoker: no  115: Fraility index:no      Recommendations:    #1.  The diagnosis of hepatopulmonary syndrome needs to be established.  He will need a blood gas at room air and if the PO2 is less than 60 referred to a pulmonologist for a confirmatory test #2.  He needs a full cardiac evaluation      Patients overall evaluation will be discussed at the Liver Transplant selection committee meeting with a final recommendation on the patients suitability for transplant to be made at that time.    Consult Full  Details:  Charan Pretty was seen in consultation at the request of Dr. Alva BATEMAN MD for evaluation as a potential liver transplant recipient.    Reason for Visit:  Charan Pretty is a 64 year old year old male with nonalcoholic steatopheatitis/Laënnec's cirrhosis, who presents for liver transplant evaluation.    HPI:  Presenting complaint: Vaniceal bleeding    Patient noticed that he had cirrhosis of the liver when he developed a variceal bleeding.  He subsequently decompensated with ascites and mild encephalopathy.  He was seen by Dr. Florian Madrid at [facility], and noticed to have pulmonary issues.  He was placed on oxygen but is not very clear if it was hepatopulmonary syndrome.  He is currently on and off oxygen he does have shortness of  breath.  He does have abdominal distention.  No recent chest pains.  He does have easy fatigability and obesity.      Past Medical History:   Diagnosis Date     Cirrhosis of liver with ascites (H) 10/29/2019     Coronary artery disease     Heart Murmor      Depressive disorder      Hypertension      Obesity      Past Surgical History:   Procedure Laterality Date     COLONOSCOPY       GI SURGERY      Upper Endo at [facility]      ORTHOPEDIC SURGERY      foot surgery, 2011     PHACOEMULSIFICATION CLEAR CORNEA WITH TORIC INTRAOCULAR LENS IMPLANT  1/2/2013    Procedure: PHACOEMULSIFICATION CLEAR CORNEA WITH TORIC INTRAOCULAR LENS IMPLANT;  LEFT PHACOEMULSIFICATION CLEAR CORNEA WITH TORIC INTRAOCULAR LENS IMPLANT ;  Surgeon: Florian Hummel MD;  Location: [facility]     PHACOEMULSIFICATION CLEAR CORNEA WITH TORIC INTRAOCULAR LENS IMPLANT  1/16/2013    Procedure: PHACOEMULSIFICATION CLEAR CORNEA WITH TORIC INTRAOCULAR LENS IMPLANT;  RIGHT PHACOEMULSIFICATION CLEAR CORNEA WITH TORIC INTRAOCULAR LENS IMPLANT ;  Surgeon: Florian Hummel MD;  Location: [facility]     Past Surgical History:   Procedure Laterality Date     COLONOSCOPY       GI SURGERY      Upper Endo at [facility]      ORTHOPEDIC SURGERY      foot surgery, 2011     PHACOEMULSIFICATION CLEAR CORNEA WITH TORIC INTRAOCULAR LENS IMPLANT  1/2/2013    Procedure: PHACOEMULSIFICATION CLEAR CORNEA WITH TORIC INTRAOCULAR LENS IMPLANT;  LEFT PHACOEMULSIFICATION CLEAR CORNEA WITH TORIC INTRAOCULAR LENS IMPLANT ;  Surgeon: Florian Hummel MD;  Location: [facility]     PHACOEMULSIFICATION CLEAR CORNEA WITH TORIC INTRAOCULAR LENS IMPLANT  1/16/2013    Procedure: PHACOEMULSIFICATION CLEAR CORNEA WITH TORIC INTRAOCULAR LENS IMPLANT;  RIGHT PHACOEMULSIFICATION CLEAR CORNEA WITH TORIC INTRAOCULAR LENS IMPLANT ;  Surgeon: Florian Hummel MD;  Location: [facility]     No family history on file.  Allergies   Allergen Reactions     Acetaminophen Nausea and Vomiting     Losartan Cough     Prior  "to Admission medications    Medication Sig Start Date End Date Taking? Authorizing Provider   Ferrous Gluconate 240 (27 Fe) MG TABS    Yes Reported, Patient   furosemide (LASIX) 40 MG tablet Take 40 mg by mouth 9/9/19  Yes Reported, Patient   levothyroxine (SYNTHROID/LEVOTHROID) 25 MCG tablet Take 25 mcg by mouth Pt  Is taking 50 mcg on Tuesday and on fridays 10/31/18  Yes Reported, Patient   Metoprolol Succinate (TOPROL XL PO) Take 50 mg by mouth daily.   Yes Reported, Patient   pantoprazole (PROTONIX) 40 MG EC tablet Take 40 mg by mouth 9/9/19  Yes Reported, Patient   potassium chloride ER (K-DUR/KLOR-CON M) 10 MEQ CR tablet Take 20 mEq by mouth 9/9/19  Yes Reported, Patient       Previous Transplant Hx: No    Cardiovascular Hx:       h/o Cardiac Issues: Yes -  Shortness of breath       Exercise Tolerance: shortness of breath with exertion.    Potential Donor(s): No    ROS:    REVIEW OF SYSTEMS (check box if normal)  [x]                GENERAL  [x]                  PULMONARY [x]                 GENITOURINARY  [x]                 CNS                 [x]                  CARDIAC  [x]                  ENDOCRINE  [x]                 EARS,NOSE,THROAT [x]                  GASTROINTESTINAL [x]                  NEUROLOGIC    [x]                 MUSCLOSKELTAL  [x]                   HEMATOLOGY    Examination:     Vitals:  BP (!) 150/66   Pulse 80   Ht 1.676 m (5' 6\")   Wt 104.7 kg (230 lb 14.4 oz)   BMI 37.27 kg/m      GENERAL APPEARANCE: alert and no distress  EYES: PERRL  HENT: mouth without ulcers or lesions  NECK: supple, no adenopathy  RESP: lungs clear to auscultation - no rales, rhonchi or wheezes  CV: regular rhythm, normal rate, no rub   ABDOMEN:  soft, nontender, abdomen is grossly distended with ascites present.  No HSM or masses and bowel sounds normal  MS: extremities normal- no gross deformities noted, no evidence of inflammation in joints, no muscle tenderness  SKIN: no rash  NEURO: Normal strength and " tone, sensory exam grossly normal, mentation intact and speech normal  PSYCH: mentation appears normal. and affect normal/bright      Results:   Recent Results (from the past 168 hour(s))   Treponema Abs w Reflex to RPR and Titer    Collection Time: 10/28/19  6:24 AM   Result Value Ref Range    Treponema Antibodies Nonreactive NR^Nonreactive   HIV Antigen Antibody Combo Pretransplant    Collection Time: 10/28/19  6:24 AM   Result Value Ref Range    HIV Antigen Antibody Combo Pretransplant Nonreactive NR^Nonreactive   EBV Capsid Antibody IgG    Collection Time: 10/28/19  6:24 AM   Result Value Ref Range    EBV Capsid Antibody IgG >8.0 (H) 0.0 - 0.8 AI   CMV Antibody IgG    Collection Time: 10/28/19  6:24 AM   Result Value Ref Range    CMV Antibody IgG >8.0 (H) 0.0 - 0.8 AI   CBC with platelets    Collection Time: 10/28/19  6:24 AM   Result Value Ref Range    WBC 5.7 4.0 - 11.0 10e9/L    RBC Count 4.68 4.4 - 5.9 10e12/L    Hemoglobin 13.9 13.3 - 17.7 g/dL    Hematocrit 43.5 40.0 - 53.0 %    MCV 93 78 - 100 fl    MCH 29.7 26.5 - 33.0 pg    MCHC 32.0 31.5 - 36.5 g/dL    RDW 13.6 10.0 - 15.0 %    Platelet Count 69 (L) 150 - 450 10e9/L   INR    Collection Time: 10/28/19  6:24 AM   Result Value Ref Range    INR 1.19 (H) 0.86 - 1.14   TSH with free T4 reflex    Collection Time: 10/28/19  6:24 AM   Result Value Ref Range    TSH 12.97 (H) 0.40 - 4.00 mU/L   Vitamin D Deficiency    Collection Time: 10/28/19  6:24 AM   Result Value Ref Range    Vitamin D Deficiency screening 22 20 - 75 ug/L   Prostate spec antigen screen    Collection Time: 10/28/19  6:24 AM   Result Value Ref Range    PSA 0.54 0 - 4 ug/L   Quantiferon TB Gold Plus    Collection Time: 10/28/19  6:24 AM   Result Value Ref Range    Quantiferon-TB Gold Plus Result Negative NEG^Negative    TB1 Ag minus Nil Value 0.00 IU/mL    TB2 Ag minus Nil Value 0.00 IU/mL    Mitogen minus Nil Result >10.00 IU/mL    Nil Result 0.07 IU/mL   Phosphorus    Collection Time: 10/28/19   6:24 AM   Result Value Ref Range    Phosphorus 3.4 2.5 - 4.5 mg/dL   Iron and iron binding capacity    Collection Time: 10/28/19  6:24 AM   Result Value Ref Range    Iron 142 35 - 180 ug/dL    Iron Binding Cap 425 240 - 430 ug/dL    Iron Saturation Index 34 15 - 46 %   AFP tumor marker    Collection Time: 10/28/19  6:24 AM   Result Value Ref Range    Alpha Fetoprotein <1.5 0 - 8 ug/L   Basic metabolic panel    Collection Time: 10/28/19  6:24 AM   Result Value Ref Range    Sodium 144 133 - 144 mmol/L    Potassium 3.8 3.4 - 5.3 mmol/L    Chloride 111 (H) 94 - 109 mmol/L    Carbon Dioxide 28 20 - 32 mmol/L    Anion Gap 6 3 - 14 mmol/L    Glucose 102 (H) 70 - 99 mg/dL    Urea Nitrogen 21 7 - 30 mg/dL    Creatinine 0.92 0.66 - 1.25 mg/dL    GFR Estimate 88 >60 mL/min/[1.73_m2]    GFR Estimate If Black >90 >60 mL/min/[1.73_m2]    Calcium 8.9 8.5 - 10.1 mg/dL   Hepatic panel    Collection Time: 10/28/19  6:24 AM   Result Value Ref Range    Bilirubin Direct 0.3 (H) 0.0 - 0.2 mg/dL    Bilirubin Total 0.8 0.2 - 1.3 mg/dL    Albumin 3.6 3.4 - 5.0 g/dL    Protein Total 7.7 6.8 - 8.8 g/dL    Alkaline Phosphatase 104 40 - 150 U/L    ALT 59 0 - 70 U/L    AST 47 (H) 0 - 45 U/L   Lipid Profile    Collection Time: 10/28/19  6:24 AM   Result Value Ref Range    Cholesterol 153 <200 mg/dL    Triglycerides 138 <150 mg/dL    HDL Cholesterol 45 >39 mg/dL    LDL Cholesterol Calculated 80 <100 mg/dL    Non HDL Cholesterol 107 <130 mg/dL   ABO/Rh type and screen    Collection Time: 10/28/19  6:24 AM   Result Value Ref Range    ABO O     RH(D) Pos     Antibody Screen Neg     Test Valid Only At          [facility]    Specimen Expires 10/31/2019    ABO Subtyping [[id number]]    Collection Time: 10/28/19  6:24 AM   Result Value Ref Range    Antigen Type Canceled, Test credited     Blood Bank Comment       Patient not ABO type A or AB. A subtyping not applicable. 10/28/19    T4 free    Collection Time:  10/28/19  6:24 AM   Result Value Ref Range    T4 Free 0.89 0.76 - 1.46 ng/dL   Antibody titer red cell [[id number]]    Collection Time: 10/28/19  6:31 AM   Result Value Ref Range    Antibody Titer Anti A: IgG >256  Anti B: IgG >256      ABO type [[id number]]    Collection Time: 10/28/19  6:32 AM   Result Value Ref Range    ABO O     RH(D) Pos     Specimen Expires 10/31/2019    Protein  random urine with Creat Ratio    Collection Time: 10/28/19  6:39 AM   Result Value Ref Range    Protein Random Urine <0.05 g/L    Protein Total Urine g/gr Creatinine Unable to calculate due to low value 0 - 0.2 g/g Cr   Ethyl Glucuronide Urine    Collection Time: 10/28/19  6:39 AM   Result Value Ref Range    Ethyl Glucuronide Urine Negative      UA reflex to Microscopic and Culture    Collection Time: 10/28/19  6:39 AM   Result Value Ref Range    Color Urine Straw     Appearance Urine Clear     Glucose Urine Negative NEG^Negative mg/dL    Bilirubin Urine Negative NEG^Negative    Ketones Urine Negative NEG^Negative mg/dL    Specific Gravity Urine 1.006 1.003 - 1.035    Blood Urine Negative NEG^Negative    pH Urine 6.0 5.0 - 7.0 pH    Protein Albumin Urine Negative NEG^Negative mg/dL    Urobilinogen mg/dL 0.0 0.0 - 2.0 mg/dL    Nitrite Urine Negative NEG^Negative    Leukocyte Esterase Urine Negative NEG^Negative    Source Midstream Urine    Creatinine urine calculation only    Collection Time: 10/28/19  6:39 AM   Result Value Ref Range    Creatinine Urine 12 mg/dL   EKG 12-lead, tracing only [EKG1]    Collection Time: 10/28/19  6:48 AM   Result Value Ref Range    Interpretation ECG Click View Image link to view waveform and result    General PFT Lab (Please always keep checked)    Collection Time: 10/28/19  8:27 AM   Result Value Ref Range    FIO2-Pre 21.00 %   Blood gas arterial and oxyhgb    Collection Time: 10/28/19  8:36 AM   Result Value Ref Range    pH Arterial 7.44 7.35 - 7.45 pH    pCO2 Arterial 36 35 - 45 mm Hg    pO2 Arterial 84 80  - 105 mm Hg    Bicarbonate Arterial 24 21 - 28 mmol/L    FIO2 21     Oxyhemoglobin Arterial 96 92 - 100 %    Base Excess Art 0.9 mmol/L   Carbon monoxide    Collection Time: 10/28/19  8:36 AM   Result Value Ref Range    Carbon Monoxide 1.0 0 - 2 %   Hemoglobin    Collection Time: 10/28/19  8:36 AM   Result Value Ref Range    Hemoglobin 13.8 13.3 - 17.7 g/dL   Methemoglobin    Collection Time: 10/28/19  8:36 AM   Result Value Ref Range    Methemoglobin 0.0 0 - 3 %     I had a long discussion with the patient regarding liver transplantation which included but was not limited to  the following points:    1. Liver transplant selection committee process.  2. The federal rules for cadaveric waiting list, the size and blood type matching of the organ. The availability of living-related donor transplantation.  3. The types of donors: brain death donors, non-heart beating donors, partial liver grafts: splits and living donor grafts  4. Extended criteria  Donors (older age, steasosis) and the increased  risk of primary non-function using the extended criteria donors  5. The CDC high risk donors,  Risk of donor transmitted infections and donor transmitted malignancy  6. The liver transplant operation and the associated risks and technical complications which can include intraoperative death, post operative death,  Primary non-function, bleeding requiring re-operations, arterial and biliary complications, bowel perforations, and intra abdominal abscess. Some of these complicaitons may require a second operation.  7. The postoperative course, the ICU stay and risk of postoperative complications which can include sepsis, MI, stroke, brain injury, pneumonia, pleural effusions, and renal dysfunction.  8. The current 1 year and 5 year graft and patient survivals.  9. The need for life long immunosuppressive therapy and the side effects of these medications, including the possibility of toxicity, opportunistic infections, risk of  cancer including lymphoma, and the possibility of rejection even if the patient is taking the medication exactly as prescribed.  10. The need for compliance with medications and follow-up visits in the clinic and thereafter.  11. The patient and family understand these risks and wish to proceed to transplantation       I spent ...60 ....minutes with the patient and more than 50% of the time was spend in direct face to face counseling.  CC  ZENAIDA BATEMAN    Copy to patient     [address]  [facility] [id number]

## 2019-10-29 NOTE — NURSING NOTE
"Chief Complaint   Patient presents with     Transplant Evaluation     Liver eval       Blood pressure (!) 150/66, pulse 80, height 1.676 m (5' 6\"), weight 104.7 kg (230 lb 14.4 oz).    Rylee Duncan CMA on 10/29/2019 at 10:01 AM    "

## 2019-10-29 NOTE — LETTER
10/29/2019      RE: Charan Pretty  [address]  [facility] [address]       SUBJECTIVE:  I had the pleasure of seeing Charan Pretty for consultation in the Liver Transplant Clinic at the [facility] on 10/29/2019.  Mr. Pretty presents for his pretransplant evaluation.      He has been diagnosed with cirrhosis, most likely on the basis of nonalcoholic steatohepatitis.  He has been working hard to lose weight and has done so.  He was hospitalized recently at [facility] with respiratory failure.  He had an evaluation at that time which included an echocardiogram that showed a positive bubble study for either intracardiac shunt or perhaps an intrapulmonary shunt.  He has been on home O2 since because he desaturates when he is out walking.      In terms of his other complications related liver disease.  He has had ascites in the past.  He has not had any gastrointestinal bleeding or any overt signs of hepatic encephalopathy.      At present, he feels fairly well.  He denies any abdominal pain, itching or skin rash.  He has mild fatigue.  He denies any increased abdominal girth or lower extremity edema.  He denies any fevers or chills.  He does have a cough and as I mentioned, he has dyspnea on exertion.  He does desaturate when that happens.  He denies any nausea, vomiting, diarrhea or constipation.  His appetite has been good and he is trying to lose some weight.      PAST MEDICAL HISTORY:  His past medical history is significant for previous foot surgery.  He also has had a lens implant.  He did have a diagnosis of hypertension, but not now.  He also has reported to have a heart murmur.      SOCIAL HISTORY:  He does not use any alcohol or tobacco.  He came to clinic appointment with his wife and 2 children.      FAMILY HISTORY:  There is no family history of liver disease.      REVIEW OF SYSTEMS:  A complete review of systems was negative other than that noted  above.     Current Outpatient Medications   Medication     Ferrous Gluconate 240 (27 Fe) MG TABS     furosemide (LASIX) 40 MG tablet     levothyroxine (SYNTHROID/LEVOTHROID) 25 MCG tablet     Metoprolol Succinate (TOPROL XL PO)     pantoprazole (PROTONIX) 40 MG EC tablet     potassium chloride ER (K-DUR/KLOR-CON M) 10 MEQ CR tablet     No current facility-administered medications for this visit.        OBJECTIVE:     GENERAL:  On physical exam his weight is 231 pounds.  His blood pressure is 150/66.  Pulse is 90.  His O2 saturation on room air is 96%.  In general, he actually looks relatively well.     HEENT EXAM:  Shows no scleral icterus or temporal muscle wasting.     CHEST:  Clear.     ABDOMINAL EXAM:  Shows no increase in girth.  No masses or tenderness to palpation are present.   CARDIAC EXAM:  Reveals no S3, S4 or murmur.   ABDOMINAL EXAM:  Shows no increase in girth.  No masses or tenderness to palpation are present.  His liver is 10 cm in span without left lobe enlargement.  No spleen tip is palpable.     EXTREMITY EXAM:  Shows no edema.     SKIN EXAM:  Shows no stigmata of chronic liver disease.     NEUROLOGIC EXAM:  Shows no asterixis.     Recent Results (from the past 168 hour(s))   Treponema Abs w Reflex to RPR and Titer    Collection Time: 10/28/19  6:24 AM   Result Value Ref Range    Treponema Antibodies Nonreactive NR^Nonreactive   HIV Antigen Antibody Combo Pretransplant    Collection Time: 10/28/19  6:24 AM   Result Value Ref Range    HIV Antigen Antibody Combo Pretransplant Nonreactive NR^Nonreactive   EBV Capsid Antibody IgG    Collection Time: 10/28/19  6:24 AM   Result Value Ref Range    EBV Capsid Antibody IgG >8.0 (H) 0.0 - 0.8 AI   CMV Antibody IgG    Collection Time: 10/28/19  6:24 AM   Result Value Ref Range    CMV Antibody IgG >8.0 (H) 0.0 - 0.8 AI   CBC with platelets    Collection Time: 10/28/19  6:24 AM   Result Value Ref Range    WBC 5.7 4.0 - 11.0 10e9/L    RBC Count 4.68 4.4 - 5.9  10e12/L    Hemoglobin 13.9 13.3 - 17.7 g/dL    Hematocrit 43.5 40.0 - 53.0 %    MCV 93 78 - 100 fl    MCH 29.7 26.5 - 33.0 pg    MCHC 32.0 31.5 - 36.5 g/dL    RDW 13.6 10.0 - 15.0 %    Platelet Count 69 (L) 150 - 450 10e9/L   INR    Collection Time: 10/28/19  6:24 AM   Result Value Ref Range    INR 1.19 (H) 0.86 - 1.14   TSH with free T4 reflex    Collection Time: 10/28/19  6:24 AM   Result Value Ref Range    TSH 12.97 (H) 0.40 - 4.00 mU/L   Vitamin D Deficiency    Collection Time: 10/28/19  6:24 AM   Result Value Ref Range    Vitamin D Deficiency screening 22 20 - 75 ug/L   Prostate spec antigen screen    Collection Time: 10/28/19  6:24 AM   Result Value Ref Range    PSA 0.54 0 - 4 ug/L   Quantiferon TB Gold Plus    Collection Time: 10/28/19  6:24 AM   Result Value Ref Range    Quantiferon-TB Gold Plus Result Negative NEG^Negative    TB1 Ag minus Nil Value 0.00 IU/mL    TB2 Ag minus Nil Value 0.00 IU/mL    Mitogen minus Nil Result >10.00 IU/mL    Nil Result 0.07 IU/mL   Phosphorus    Collection Time: 10/28/19  6:24 AM   Result Value Ref Range    Phosphorus 3.4 2.5 - 4.5 mg/dL   Iron and iron binding capacity    Collection Time: 10/28/19  6:24 AM   Result Value Ref Range    Iron 142 35 - 180 ug/dL    Iron Binding Cap 425 240 - 430 ug/dL    Iron Saturation Index 34 15 - 46 %   AFP tumor marker    Collection Time: 10/28/19  6:24 AM   Result Value Ref Range    Alpha Fetoprotein <1.5 0 - 8 ug/L   Basic metabolic panel    Collection Time: 10/28/19  6:24 AM   Result Value Ref Range    Sodium 144 133 - 144 mmol/L    Potassium 3.8 3.4 - 5.3 mmol/L    Chloride 111 (H) 94 - 109 mmol/L    Carbon Dioxide 28 20 - 32 mmol/L    Anion Gap 6 3 - 14 mmol/L    Glucose 102 (H) 70 - 99 mg/dL    Urea Nitrogen 21 7 - 30 mg/dL    Creatinine 0.92 0.66 - 1.25 mg/dL    GFR Estimate 88 >60 mL/min/[1.73_m2]    GFR Estimate If Black >90 >60 mL/min/[1.73_m2]    Calcium 8.9 8.5 - 10.1 mg/dL   Hepatic panel    Collection Time: 10/28/19  6:24 AM    Result Value Ref Range    Bilirubin Direct 0.3 (H) 0.0 - 0.2 mg/dL    Bilirubin Total 0.8 0.2 - 1.3 mg/dL    Albumin 3.6 3.4 - 5.0 g/dL    Protein Total 7.7 6.8 - 8.8 g/dL    Alkaline Phosphatase 104 40 - 150 U/L    ALT 59 0 - 70 U/L    AST 47 (H) 0 - 45 U/L   Lipid Profile    Collection Time: 10/28/19  6:24 AM   Result Value Ref Range    Cholesterol 153 <200 mg/dL    Triglycerides 138 <150 mg/dL    HDL Cholesterol 45 >39 mg/dL    LDL Cholesterol Calculated 80 <100 mg/dL    Non HDL Cholesterol 107 <130 mg/dL   ABO/Rh type and screen    Collection Time: 10/28/19  6:24 AM   Result Value Ref Range    ABO O     RH(D) Pos     Antibody Screen Neg     Test Valid Only At          [facility]    Specimen Expires 10/31/2019    ABO Subtyping [[id number]]    Collection Time: 10/28/19  6:24 AM   Result Value Ref Range    Antigen Type Canceled, Test credited     Blood Bank Comment       Patient not ABO type A or AB. A subtyping not applicable. 10/28/19 LH   T4 free    Collection Time: 10/28/19  6:24 AM   Result Value Ref Range    T4 Free 0.89 0.76 - 1.46 ng/dL   Antibody titer red cell [[id number]]    Collection Time: 10/28/19  6:31 AM   Result Value Ref Range    Antibody Titer Anti A: IgG >256  Anti B: IgG >256      ABO type [[id number]]    Collection Time: 10/28/19  6:32 AM   Result Value Ref Range    ABO O     RH(D) Pos     Specimen Expires 10/31/2019    Protein  random urine with Creat Ratio    Collection Time: 10/28/19  6:39 AM   Result Value Ref Range    Protein Random Urine <0.05 g/L    Protein Total Urine g/gr Creatinine Unable to calculate due to low value 0 - 0.2 g/g Cr   Ethyl Glucuronide Urine    Collection Time: 10/28/19  6:39 AM   Result Value Ref Range    Ethyl Glucuronide Urine Negative      UA reflex to Microscopic and Culture    Collection Time: 10/28/19  6:39 AM   Result Value Ref Range    Color Urine Straw     Appearance Urine Clear     Glucose Urine Negative NEG^Negative  mg/dL    Bilirubin Urine Negative NEG^Negative    Ketones Urine Negative NEG^Negative mg/dL    Specific Gravity Urine 1.006 1.003 - 1.035    Blood Urine Negative NEG^Negative    pH Urine 6.0 5.0 - 7.0 pH    Protein Albumin Urine Negative NEG^Negative mg/dL    Urobilinogen mg/dL 0.0 0.0 - 2.0 mg/dL    Nitrite Urine Negative NEG^Negative    Leukocyte Esterase Urine Negative NEG^Negative    Source Midstream Urine    Creatinine urine calculation only    Collection Time: 10/28/19  6:39 AM   Result Value Ref Range    Creatinine Urine 12 mg/dL   EKG 12-lead, tracing only [EKG1]    Collection Time: 10/28/19  6:48 AM   Result Value Ref Range    Interpretation ECG Click View Image link to view waveform and result    General PFT Lab (Please always keep checked)    Collection Time: 10/28/19  8:27 AM   Result Value Ref Range    FIO2-Pre 21.00 %   Blood gas arterial and oxyhgb    Collection Time: 10/28/19  8:36 AM   Result Value Ref Range    pH Arterial 7.44 7.35 - 7.45 pH    pCO2 Arterial 36 35 - 45 mm Hg    pO2 Arterial 84 80 - 105 mm Hg    Bicarbonate Arterial 24 21 - 28 mmol/L    FIO2 21     Oxyhemoglobin Arterial 96 92 - 100 %    Base Excess Art 0.9 mmol/L   Carbon monoxide    Collection Time: 10/28/19  8:36 AM   Result Value Ref Range    Carbon Monoxide 1.0 0 - 2 %   Hemoglobin    Collection Time: 10/28/19  8:36 AM   Result Value Ref Range    Hemoglobin 13.8 13.3 - 17.7 g/dL   Methemoglobin    Collection Time: 10/28/19  8:36 AM   Result Value Ref Range    Methemoglobin 0.0 0 - 3 %      IMPRESSION:  My impression is that Mr. Pretty has well-compensated cirrhosis caused by nonalcoholic fatty liver disease.  His current MELD score is 8.  The principal reason why he was sent over here was the question of hepatopulmonary syndrome, and given his room air pO2 was 84, I really wonder if that is going to be the case.  We will have him seen by Pulmonary and he also will see Cardiology here to better sort this out.  His  echocardiogram did suggest that he had an intracardiac shunt, but this can easily be confused with a intrapulmonary shunt if one is not looking for HPS.      I did spend more than 50% of our 60-minute visit discussing with them what hepatopulmonary syndrome is and what the expected results of a liver transplantation of these circumstances.  I also discussed live donor liver transplantation with them and also did point out that it is not clear if liver transplantation is going to be indicated.  Clearly, we are not going to go forward with liver transplant if we do not think it is going to improve his quality of life.  Otherwise, he is up to date with regard to cancer screening and vaccinations.  We will discuss him at our multidisciplinary liver transplant selection conference this afternoon.      Thank you very much for allowing me to participate in the care of this patient.  If you have any questions regarding my recommendations, please do not hesitate to contact me.       Vinay Sanchez MD      Professor of Medicine  [employer]      Executive Medical Director, Solid Organ Transplant Program  [facility]        Vinay Sanchez MD

## 2019-10-29 NOTE — Clinical Note
Please set up echo with bubble study for HPS sometime before his 11/11 appt with cardiology. Patient is aware of this, but will need a call. Thanks, tk

## 2019-10-29 NOTE — COMMITTEE REVIEW
Abdominal Committee Review Note     Evaluation Date: 10/28/2019  Committee Review Date: 10/29/2019    Organ being evaluated for: Liver    Transplant Phase: Evaluation  Transplant Status: Active    Transplant Coordinator: Jacinto Khan Jr.  Transplant Surgeon:   Kush Ma    Referring Physician: Florian Mai    Primary Diagnosis:   Secondary Diagnosis:     Committee Review Members:  Nutrition Sera Masters, RD   Pharmacy Alexandro Roa, [facility]    - Clinical Ranulfo Licona, DARIA, Janette Oneil, [facility]   Transplant Crystal Verna Balbuena, RN, Hawk Simental PA-C, Vinay Sanchez MD, Jr Jacinto Khan, RN, Kindra Roy MD, Rena Keys, APRN CNP, Constanza Linda, WILIAN, Alvaro Hutchins MD, Roger De La Torre MD, Bridgett Fritz, WILIAN, Kush Ma MD, Ingrid Jackson MD, Thomas M. Leventhal, MD, Orlando Nazario MD, Fernando Kline MD       Transplant Eligibility: Cirrhosis with MELD, MENDOZA    Committee Review Decision: Needs Re-presentation    Relative Contraindications: Other, needs SOB work-up and dx    Absolute Contraindications: None    Committee Chair Vinay Sanchez MD verbally attested to the committee's decision.    Committee Discussion Details:     Re-discuss pending completion of evaluation, specifically, pulmonary sooner than January and cardiology scheduled for 11/11.    - Echo with bubble at [facility]

## 2019-10-29 NOTE — LETTER
10/29/2019       RE: Charan Pretty  [address]  [facility] [address]     Dear Colleague,    Thank you for referring your patient, Charan Pretty, to the [facility] SOLID ORGAN TRANSPLANT at [facility]. Please see a copy of my visit note below.    HPI      ROS      Physical Exam    Assessment and Plan:  1. liver transplant evaluation - patient is a good candidate overall. Benefits and surgical risks of a liver transplantation were discussed.  2.  End stage liver disease due to Laennec's    Surgical evaluation:  1. Portal Vein:Patent  2. Hepatic Artery: Open  3. TIPS: absent  4. Previous Abdominal Surgery: No  5. Hepatocellular Carcinoma: None  6. Ascites: Present - minimal  7. Costal Angle: wide  8. Portopulmonary Hypertension: absent  9. 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 Cardiac Evaluation: needs stress echocardiogram  11. Nutritional Status: Good  12. Diabetes: no  13.Hypertension yes   14. Smoker: no  115: Fraility index:no      Recommendations:    #1.  The diagnosis of hepatopulmonary syndrome needs to be established.  He will need a blood gas at room air and if the PO2 is less than 60 referred to a pulmonologist for a confirmatory test #2.  He needs a full cardiac evaluation      Patients overall evaluation will be discussed at the Liver Transplant selection committee meeting with a final recommendation on the patients suitability for transplant to be made at that time.    Consult Full  Details:  Charan Pretty was seen in consultation at the request of Dr. Alva BATEMAN MD for evaluation as a potential liver transplant recipient.    Reason for Visit:  Charan Pretty is a 64 year old year old male with nonalcoholic steatopheatitis/Laënnec's cirrhosis, who presents for liver transplant evaluation.    HPI:  Presenting complaint: Vaniceal bleeding    Patient noticed that he had cirrhosis of the liver when he developed a variceal  bleeding.  He subsequently decompensated with ascites and mild encephalopathy.  He was seen by Dr. Florian Madrid at [facility], and noticed to have pulmonary issues.  He was placed on oxygen but is not very clear if it was hepatopulmonary syndrome.  He is currently on and off oxygen he does have shortness of breath.  He does have abdominal distention.  No recent chest pains.  He does have easy fatigability and obesity.      Past Medical History:   Diagnosis Date     Cirrhosis of liver with ascites (H) 10/29/2019     Coronary artery disease     Heart Murmor      Depressive disorder      Hypertension      Obesity      Past Surgical History:   Procedure Laterality Date     COLONOSCOPY       GI SURGERY      Upper Endo at [facility]      ORTHOPEDIC [facility]      foot surgery, 2011     PHACOEMULSIFICATION CLEAR CORNEA WITH TORIC INTRAOCULAR LENS IMPLANT  1/2/2013    Procedure: PHACOEMULSIFICATION CLEAR CORNEA WITH TORIC INTRAOCULAR LENS IMPLANT;  LEFT PHACOEMULSIFICATION CLEAR CORNEA WITH TORIC INTRAOCULAR LENS IMPLANT ;  Surgeon: Florian Hummel MD;  Location: [facility]     PHACOEMULSIFICATION CLEAR CORNEA WITH TORIC INTRAOCULAR LENS IMPLANT  1/16/2013    Procedure: PHACOEMULSIFICATION CLEAR CORNEA WITH TORIC INTRAOCULAR LENS IMPLANT;  RIGHT PHACOEMULSIFICATION CLEAR CORNEA WITH TORIC INTRAOCULAR LENS IMPLANT ;  Surgeon: Florian Hummel MD;  Location: [facility]     Past Surgical History:   Procedure Laterality Date     COLONOSCOPY       GI SURGERY      Upper Endo at [facility]      ORTHOPEDIC SURGERY      foot surgery, 2011     PHACOEMULSIFICATION CLEAR CORNEA WITH TORIC INTRAOCULAR LENS IMPLANT  1/2/2013    Procedure: PHACOEMULSIFICATION CLEAR CORNEA WITH TORIC INTRAOCULAR LENS IMPLANT;  LEFT PHACOEMULSIFICATION CLEAR CORNEA WITH TORIC INTRAOCULAR LENS IMPLANT ;  Surgeon: Florian Hummel MD;  Location: [facility]     PHACOEMULSIFICATION CLEAR CORNEA WITH TORIC INTRAOCULAR LENS IMPLANT  1/16/2013    Procedure: PHACOEMULSIFICATION CLEAR  "CORNEA WITH TORIC INTRAOCULAR LENS IMPLANT;  RIGHT PHACOEMULSIFICATION CLEAR CORNEA WITH TORIC INTRAOCULAR LENS IMPLANT ;  Surgeon: Florian Hummel MD;  Location: [facility]     No family history on file.  Allergies   Allergen Reactions     Acetaminophen Nausea and Vomiting     Losartan Cough     Prior to Admission medications    Medication Sig Start Date End Date Taking? Authorizing Provider   Ferrous Gluconate 240 (27 Fe) MG TABS    Yes Reported, Patient   furosemide (LASIX) 40 MG tablet Take 40 mg by mouth 9/9/19  Yes Reported, Patient   levothyroxine (SYNTHROID/LEVOTHROID) 25 MCG tablet Take 25 mcg by mouth Pt  Is taking 50 mcg on Tuesday and on fridays 10/31/18  Yes Reported, Patient   Metoprolol Succinate (TOPROL XL PO) Take 50 mg by mouth daily.   Yes Reported, Patient   pantoprazole (PROTONIX) 40 MG EC tablet Take 40 mg by mouth 9/9/19  Yes Reported, Patient   potassium chloride ER (K-DUR/KLOR-CON M) 10 MEQ CR tablet Take 20 mEq by mouth 9/9/19  Yes Reported, Patient       Previous Transplant Hx: No    Cardiovascular Hx:       h/o Cardiac Issues: Yes -  Shortness of breath       Exercise Tolerance: shortness of breath with exertion.    Potential Donor(s): No    ROS:    REVIEW OF SYSTEMS (check box if normal)  [x]                GENERAL  [x]                  PULMONARY [x]                 GENITOURINARY  [x]                 CNS                 [x]                  CARDIAC  [x]                  ENDOCRINE  [x]                 EARS,NOSE,THROAT [x]                  GASTROINTESTINAL [x]                  NEUROLOGIC    [x]                 MUSCLOSKELTAL  [x]                   HEMATOLOGY    Examination:     Vitals:  BP (!) 150/66   Pulse 80   Ht 1.676 m (5' 6\")   Wt 104.7 kg (230 lb 14.4 oz)   BMI 37.27 kg/m       GENERAL APPEARANCE: alert and no distress  EYES: PERRL  HENT: mouth without ulcers or lesions  NECK: supple, no adenopathy  RESP: lungs clear to auscultation - no rales, rhonchi or wheezes  CV: regular rhythm, " normal rate, no rub   ABDOMEN:  soft, nontender, abdomen is grossly distended with ascites present.  No HSM or masses and bowel sounds normal  MS: extremities normal- no gross deformities noted, no evidence of inflammation in joints, no muscle tenderness  SKIN: no rash  NEURO: Normal strength and tone, sensory exam grossly normal, mentation intact and speech normal  PSYCH: mentation appears normal. and affect normal/bright      Results:   Recent Results (from the past 168 hour(s))   Treponema Abs w Reflex to RPR and Titer    Collection Time: 10/28/19  6:24 AM   Result Value Ref Range    Treponema Antibodies Nonreactive NR^Nonreactive   HIV Antigen Antibody Combo Pretransplant    Collection Time: 10/28/19  6:24 AM   Result Value Ref Range    HIV Antigen Antibody Combo Pretransplant Nonreactive NR^Nonreactive   EBV Capsid Antibody IgG    Collection Time: 10/28/19  6:24 AM   Result Value Ref Range    EBV Capsid Antibody IgG >8.0 (H) 0.0 - 0.8 AI   CMV Antibody IgG    Collection Time: 10/28/19  6:24 AM   Result Value Ref Range    CMV Antibody IgG >8.0 (H) 0.0 - 0.8 AI   CBC with platelets    Collection Time: 10/28/19  6:24 AM   Result Value Ref Range    WBC 5.7 4.0 - 11.0 10e9/L    RBC Count 4.68 4.4 - 5.9 10e12/L    Hemoglobin 13.9 13.3 - 17.7 g/dL    Hematocrit 43.5 40.0 - 53.0 %    MCV 93 78 - 100 fl    MCH 29.7 26.5 - 33.0 pg    MCHC 32.0 31.5 - 36.5 g/dL    RDW 13.6 10.0 - 15.0 %    Platelet Count 69 (L) 150 - 450 10e9/L   INR    Collection Time: 10/28/19  6:24 AM   Result Value Ref Range    INR 1.19 (H) 0.86 - 1.14   TSH with free T4 reflex    Collection Time: 10/28/19  6:24 AM   Result Value Ref Range    TSH 12.97 (H) 0.40 - 4.00 mU/L   Vitamin D Deficiency    Collection Time: 10/28/19  6:24 AM   Result Value Ref Range    Vitamin D Deficiency screening 22 20 - 75 ug/L   Prostate spec antigen screen    Collection Time: 10/28/19  6:24 AM   Result Value Ref Range    PSA 0.54 0 - 4 ug/L   Quantiferon TB Gold Plus     Collection Time: 10/28/19  6:24 AM   Result Value Ref Range    Quantiferon-TB Gold Plus Result Negative NEG^Negative    TB1 Ag minus Nil Value 0.00 IU/mL    TB2 Ag minus Nil Value 0.00 IU/mL    Mitogen minus Nil Result >10.00 IU/mL    Nil Result 0.07 IU/mL   Phosphorus    Collection Time: 10/28/19  6:24 AM   Result Value Ref Range    Phosphorus 3.4 2.5 - 4.5 mg/dL   Iron and iron binding capacity    Collection Time: 10/28/19  6:24 AM   Result Value Ref Range    Iron 142 35 - 180 ug/dL    Iron Binding Cap 425 240 - 430 ug/dL    Iron Saturation Index 34 15 - 46 %   AFP tumor marker    Collection Time: 10/28/19  6:24 AM   Result Value Ref Range    Alpha Fetoprotein <1.5 0 - 8 ug/L   Basic metabolic panel    Collection Time: 10/28/19  6:24 AM   Result Value Ref Range    Sodium 144 133 - 144 mmol/L    Potassium 3.8 3.4 - 5.3 mmol/L    Chloride 111 (H) 94 - 109 mmol/L    Carbon Dioxide 28 20 - 32 mmol/L    Anion Gap 6 3 - 14 mmol/L    Glucose 102 (H) 70 - 99 mg/dL    Urea Nitrogen 21 7 - 30 mg/dL    Creatinine 0.92 0.66 - 1.25 mg/dL    GFR Estimate 88 >60 mL/min/[1.73_m2]    GFR Estimate If Black >90 >60 mL/min/[1.73_m2]    Calcium 8.9 8.5 - 10.1 mg/dL   Hepatic panel    Collection Time: 10/28/19  6:24 AM   Result Value Ref Range    Bilirubin Direct 0.3 (H) 0.0 - 0.2 mg/dL    Bilirubin Total 0.8 0.2 - 1.3 mg/dL    Albumin 3.6 3.4 - 5.0 g/dL    Protein Total 7.7 6.8 - 8.8 g/dL    Alkaline Phosphatase 104 40 - 150 U/L    ALT 59 0 - 70 U/L    AST 47 (H) 0 - 45 U/L   Lipid Profile    Collection Time: 10/28/19  6:24 AM   Result Value Ref Range    Cholesterol 153 <200 mg/dL    Triglycerides 138 <150 mg/dL    HDL Cholesterol 45 >39 mg/dL    LDL Cholesterol Calculated 80 <100 mg/dL    Non HDL Cholesterol 107 <130 mg/dL   ABO/Rh type and screen    Collection Time: 10/28/19  6:24 AM   Result Value Ref Range    ABO O     RH(D) Pos     Antibody Screen Neg     Test Valid Only At          [facility]    Specimen Expires 10/31/2019    ABO Subtyping [[id number]]    Collection Time: 10/28/19  6:24 AM   Result Value Ref Range    Antigen Type Canceled, Test credited     Blood Bank Comment       Patient not ABO type A or AB. A subtyping not applicable. 10/28/19 LH   T4 free    Collection Time: 10/28/19  6:24 AM   Result Value Ref Range    T4 Free 0.89 0.76 - 1.46 ng/dL   Antibody titer red cell [[id number]]    Collection Time: 10/28/19  6:31 AM   Result Value Ref Range    Antibody Titer Anti A: IgG >256  Anti B: IgG >256      ABO type [[id number]]    Collection Time: 10/28/19  6:32 AM   Result Value Ref Range    ABO O     RH(D) Pos     Specimen Expires 10/31/2019    Protein  random urine with Creat Ratio    Collection Time: 10/28/19  6:39 AM   Result Value Ref Range    Protein Random Urine <0.05 g/L    Protein Total Urine g/gr Creatinine Unable to calculate due to low value 0 - 0.2 g/g Cr   Ethyl Glucuronide Urine    Collection Time: 10/28/19  6:39 AM   Result Value Ref Range    Ethyl Glucuronide Urine Negative      UA reflex to Microscopic and Culture    Collection Time: 10/28/19  6:39 AM   Result Value Ref Range    Color Urine Straw     Appearance Urine Clear     Glucose Urine Negative NEG^Negative mg/dL    Bilirubin Urine Negative NEG^Negative    Ketones Urine Negative NEG^Negative mg/dL    Specific Gravity Urine 1.006 1.003 - 1.035    Blood Urine Negative NEG^Negative    pH Urine 6.0 5.0 - 7.0 pH    Protein Albumin Urine Negative NEG^Negative mg/dL    Urobilinogen mg/dL 0.0 0.0 - 2.0 mg/dL    Nitrite Urine Negative NEG^Negative    Leukocyte Esterase Urine Negative NEG^Negative    Source Midstream Urine    Creatinine urine calculation only    Collection Time: 10/28/19  6:39 AM   Result Value Ref Range    Creatinine Urine 12 mg/dL   EKG 12-lead, tracing only [EKG1]    Collection Time: 10/28/19  6:48 AM   Result Value Ref Range    Interpretation ECG Click View Image link to view waveform and result    General PFT Lab  (Please always keep checked)    Collection Time: 10/28/19  8:27 AM   Result Value Ref Range    FIO2-Pre 21.00 %   Blood gas arterial and oxyhgb    Collection Time: 10/28/19  8:36 AM   Result Value Ref Range    pH Arterial 7.44 7.35 - 7.45 pH    pCO2 Arterial 36 35 - 45 mm Hg    pO2 Arterial 84 80 - 105 mm Hg    Bicarbonate Arterial 24 21 - 28 mmol/L    FIO2 21     Oxyhemoglobin Arterial 96 92 - 100 %    Base Excess Art 0.9 mmol/L   Carbon monoxide    Collection Time: 10/28/19  8:36 AM   Result Value Ref Range    Carbon Monoxide 1.0 0 - 2 %   Hemoglobin    Collection Time: 10/28/19  8:36 AM   Result Value Ref Range    Hemoglobin 13.8 13.3 - 17.7 g/dL   Methemoglobin    Collection Time: 10/28/19  8:36 AM   Result Value Ref Range    Methemoglobin 0.0 0 - 3 %     I had a long discussion with the patient regarding liver transplantation which included but was not limited to  the following points:    1. Liver transplant selection committee process.  2. The federal rules for cadaveric waiting list, the size and blood type matching of the organ. The availability of living-related donor transplantation.  3. The types of donors: brain death donors, non-heart beating donors, partial liver grafts: splits and living donor grafts  4. Extended criteria  Donors (older age, steasosis) and the increased  risk of primary non-function using the extended criteria donors  5. The CDC high risk donors,  Risk of donor transmitted infections and donor transmitted malignancy  6. The liver transplant operation and the associated risks and technical complications which can include intraoperative death, post operative death,  Primary non-function, bleeding requiring re-operations, arterial and biliary complications, bowel perforations, and intra abdominal abscess. Some of these complicaitons may require a second operation.  7. The postoperative course, the ICU stay and risk of postoperative complications which can include sepsis, MI, stroke,  brain injury, pneumonia, pleural effusions, and renal dysfunction.  8. The current 1 year and 5 year graft and patient survivals.  9. The need for life long immunosuppressive therapy and the side effects of these medications, including the possibility of toxicity, opportunistic infections, risk of cancer including lymphoma, and the possibility of rejection even if the patient is taking the medication exactly as prescribed.  10. The need for compliance with medications and follow-up visits in the clinic and thereafter.  11. The patient and family understand these risks and wish to proceed to transplantation       I spent ...60 ....minutes with the patient and more than 50% of the time was spend in direct face to face counseling.      Kush Ma MD      CC  ZENAIDA BATEMAN    Copy to patient     [address]  [facility] [address]

## 2019-10-29 NOTE — NURSING NOTE
Chief Complaint   Patient presents with     Transplant Evaluation     liver yessicaal         Rylee Duncan CMA on 10/29/2019 at 10:03 AM

## 2019-10-31 ENCOUNTER — OFFICE VISIT (OUTPATIENT)
Dept: OTOLARYNGOLOGY | Facility: CLINIC | Age: 64
End: 2019-10-31
Payer: COMMERCIAL

## 2019-10-31 DIAGNOSIS — R49.0 DYSPHONIA: Primary | ICD-10-CM

## 2019-10-31 DIAGNOSIS — J38.7 IRRITABLE LARYNX SYNDROME: ICD-10-CM

## 2019-10-31 DIAGNOSIS — R05.9 COUGH: ICD-10-CM

## 2019-10-31 NOTE — PROGRESS NOTES
"[facility] VOICE CLINIC  THERAPY NOTE (CPT 55850)    Patient: Charan Pretty  Date of Service: 10/31/2019  Referring physician: Dr. Marx  Impressions from most recent evaluation: (10/10/2019):  \"R05 (Chronic Cough) R49.0 (Dysphonia) J38.7 (Irritable Larynx Syndrome).  Laryngeal evaluation demonstrated mucosal irritation and muscular hyperfunction. Perceptual evaluation demonstrated frequent dry cough, apparently triggered by talking; voice quality is tense-tight with poor airflow. Dysphonia/discomfort is accounted for by the imbalanced function of the intrinsic and extrinsic laryngeal musculature. FEES indicates some mild residual of puree texture, but this clears easily; cough with eating is more likely accounted for by overall laryngeal irritation than any deficit in the swallow. Cough/throat clear are accounted for by the hypersensitivity of the larynx and pharynx as evidenced by case history, patient complaints and absence of other organic findings; hypersensitivity is compounded by imbalance in the function of the intrinsic and extrinsic laryngeal musculature during connected speech. Because there appears to be a functional component to his symptoms, he would most likely benefit from a course of functional speech therapy.\"    SUBJECTIVE:  Since the patient's last session, they report the following:     Overall symptoms are a great deal better    SOVTs going well     Nasal spray helps a lot     Chin tuck and swallow and sipping liquids work best for cough suppression     OBJECTIVE:  PATIENT REPORTED MEASURES:  Patient Supplied Answers To SLP QOL Questionnaire  Therapy Quality of Life 10/31/2019   Since my l ast session, I used the speech therapy exercises and strategies as recommended by my speech pathologist. Agree   I feel that using my therapy techniques has become a habit Neither agree nor disagree   I feel confident in my ability to manage my current and future symptoms. Agree   Since my last session I feel " "my symptoms have --------. Improved   Overall, since starting therapy I feel my symptoms are --------. Better   Overall, how much better? A great deal better     Patient Specific Goal Metrics:  Dysponia SLP Goals 10/18/2019 10/31/2019   How severe is your cough /throat clearing, if 0 is no cough at all and 10 is the worst cough? 10 3   How much does your cough/throat-clearing problem bother you?            Very Much A little bit       THERAPEUTIC ACTIVITIES    Counseling and Education:    Asked many questions about the nature of his symptoms, and I answered all of these thoroughly.    Semi-Occluded Vocal Tract (SOVT) exercises instructed to reduce laryngeal tension, promote vocal fold pliability, and coordinate respiration and phonation    Straw with water resistance was found to be most facilitating     Sustained phonation, and voice vs. voiceless productions used to promote easy voicing and raise awareness of laryngeal tension    Ascending and descending glides utilized to promote vocal fold pliability    Instructed to use these exercises as a warm-up / cooldown, and to re-calibrate the voice throughout the day.    75% accuracy with mild clinician support    Conversational Training Therapy     Clear Speech principles targeted    Patient was able to demonstrate \"clear speech\" with mild to moderate clinician support    Negative practice targeting awareness of forward locus of resonance, through alternation between target voice quality and habitual voice quality    Patient was able to articulate the differences between two voice qualities, ultimately labeling them to promote patient ownership over therapy targets. Patient referred to them as:    Target voice - Happy Voice    Feels - no vibrations, better     Sounds - clear, loud     Habitual voice - Scratchy Voice    Feels - vibrations, burning, dry    Sounds - scratchy     Patient was able to alternate effectively between these two voices with 70% accuracy and mild " to moderate clinician support.    Additionally, instructed with concepts of flow phonation to help reduce glottal zimmer. He found this helpful.         A regimen for home practice was instructed.    I provided handouts of today's therapeutic activities to facilitate practice.    ASSESSMENT/PLAN  PROGRESS TOWARD LONG TERM GOALS:   Good progress; please see report above for objective measures    IMPRESSIONS: R05 (Chronic Cough) R49.0 (Dysphonia) J38.7 (Irritable Larynx Syndrome).Mr. Pretty demonstrated excellent progress at today's session. CTT with additional concepts of flow phonation were helpful in reading and conversational speech. Accuracy decreased within connected speech. He was able to read and talk for 15 minutes with no urge to cough; a task he could not do prior to starting therapy.     PLAN: I will see Mr. Pretty in 2 weeks, at which point we will continue advancing CTT in conversational speech.   For practice goals see AVS.       TOTAL SERVICE TIME: 60 minutes  TREATMENT (31899): 60 minutes  NO CHARGE FACILITY FEE (86515)      DORA Finney (rehan), M.A., CFY-SLP  Speech Language Pathology Clinical Fellow  SAEED Trained Vocologist   [facility]   [phone]  [email or web address]

## 2019-10-31 NOTE — LETTER
"10/31/2019       RE: Charan Pretty  [address]  [facility] [address]     Dear Colleague,    Thank you for referring your patient, Charan Pretty, to the [facility] VOICE at [facility]. Please see a copy of my visit note below.    [facility] VOICE CLINIC  THERAPY NOTE (CPT 61909)    Patient: Charan Pretty  Date of Service: 10/31/2019  Referring physician: Dr. Marx  Impressions from most recent evaluation: (10/10/2019):  \"R05 (Chronic Cough) R49.0 (Dysphonia) J38.7 (Irritable Larynx Syndrome).  Laryngeal evaluation demonstrated mucosal irritation and muscular hyperfunction. Perceptual evaluation demonstrated frequent dry cough, apparently triggered by talking; voice quality is tense-tight with poor airflow. Dysphonia/discomfort is accounted for by the imbalanced function of the intrinsic and extrinsic laryngeal musculature. FEES indicates some mild residual of puree texture, but this clears easily; cough with eating is more likely accounted for by overall laryngeal irritation than any deficit in the swallow. Cough/throat clear are accounted for by the hypersensitivity of the larynx and pharynx as evidenced by case history, patient complaints and absence of other organic findings; hypersensitivity is compounded by imbalance in the function of the intrinsic and extrinsic laryngeal musculature during connected speech. Because there appears to be a functional component to his symptoms, he would most likely benefit from a course of functional speech therapy.\"    SUBJECTIVE:  Since the patient's last session, they report the following:     Overall symptoms are a great deal better    SOVTs going well     Nasal spray helps a lot     Chin tuck and swallow and sipping liquids work best for cough suppression     OBJECTIVE:  PATIENT REPORTED MEASURES:  Patient Supplied Answers To SLP QOL Questionnaire  Therapy Quality of Life 10/31/2019   Since my l ast session, I used the " "speech therapy exercises and strategies as recommended by my speech pathologist. Agree   I feel that using my therapy techniques has become a habit Neither agree nor disagree   I feel confident in my ability to manage my current and future symptoms. Agree   Since my last session I feel my symptoms have --------. Improved   Overall, since starting therapy I feel my symptoms are --------. Better   Overall, how much better? A great deal better     Patient Specific Goal Metrics:  Dysponia SLP Goals 10/18/2019 10/31/2019   How severe is your cough /throat clearing, if 0 is no cough at all and 10 is the worst cough? 10 3   How much does your cough/throat-clearing problem bother you?            Very Much A little bit       THERAPEUTIC ACTIVITIES    Counseling and Education:    Asked many questions about the nature of his symptoms, and I answered all of these thoroughly.    Semi-Occluded Vocal Tract (SOVT) exercises instructed to reduce laryngeal tension, promote vocal fold pliability, and coordinate respiration and phonation    Straw with water resistance was found to be most facilitating     Sustained phonation, and voice vs. voiceless productions used to promote easy voicing and raise awareness of laryngeal tension    Ascending and descending glides utilized to promote vocal fold pliability    Instructed to use these exercises as a warm-up / cooldown, and to re-calibrate the voice throughout the day.    75% accuracy with mild clinician support    Conversational Training Therapy     Clear Speech principles targeted    Patient was able to demonstrate \"clear speech\" with mild to moderate clinician support    Negative practice targeting awareness of forward locus of resonance, through alternation between target voice quality and habitual voice quality    Patient was able to articulate the differences between two voice qualities, ultimately labeling them to promote patient ownership over therapy targets. Patient referred to " "them as:    Target voice - Happy Voice    Feels - no vibrations, better     Sounds - clear, loud     Habitual voice - Scratchy Voice    Feels - vibrations, burning, dry    Sounds - scratchy     Patient was able to alternate effectively between these two voices with 70% accuracy and mild to moderate clinician support.    Additionally, instructed with concepts of flow phonation to help reduce glottal zimmer. He found this helpful.         A regimen for home practice was instructed.    I provided handouts of today's therapeutic activities to facilitate practice.    ASSESSMENT/PLAN  PROGRESS TOWARD LONG TERM GOALS:   Good progress; please see report above for objective measures    IMPRESSIONS: R05 (Chronic Cough) R49.0 (Dysphonia) J38.7 (Irritable Larynx Syndrome).Mr. Pretty demonstrated excellent progress at today's session. CTT with additional concepts of flow phonation were helpful in reading and conversational speech. Accuracy decreased within connected speech. He was able to read and talk for 15 minutes with no urge to cough; a task he could not do prior to starting therapy.     PLAN: I will see Mr. Pretty in 2 weeks, at which point we will continue advancing CTT in conversational speech.   For practice goals see AVS.       TOTAL SERVICE TIME: 60 minutes  TREATMENT (66384): 60 minutes  NO CHARGE FACILITY FEE (44461)      DORA Finney (music), M.A., CFY-SLP  Speech Language Pathology Clinical Fellow  [facility] Trained Vocologist   [facility]   [phone]  [email or web address]      After Visit Summary    Patient: Charan Pretty  Date of Visit: 10/31/2019    Hygiene:     Systemic Hydration: internal hydration of the entire body  ? Sip water throughout the day (staying within the limits the Liver Doc)       Topical Hydration: hydration for the surface mucosa of the larynx and vocal folds.    Please follow strategies learned on the \"Tips for Topical Hydration\" handout  ? Nasal Spray " "1-2x/day (Saline Nasal Spray)    3 puffs in each nostril; sniff and then blow your nose  ? Gargling    Plain water or salt water at least 2x/day (or as much as you want)    Gargle with a voice    Gargle with a voice and tilt your head from side to side     Cough:   Trigger: Talking  Goal is to prevent the cough from happening and/or to shorten the duration of cough       Sip of water (cold, hot, carbonated)    Dry swallow    Chin tuck with a swallow      Gargle  ? With a voice  ? Tilt your head side to side    Angry  repeated \"sh sh sh sh\"    Suck on a lozenge with Pectin (avoid mint or menthol) or a sugar-free candy, gum, \"wet\" snacks (apples, pineapple, grapes, etc).  Also consider Xylitol products, like the Spry brand of lozenges, gum, spray    Wait \"urge surf\"        Voice (2-3x/day unless otherwise noted):    Semi-occluded vocal tract exercise:   ? Cup and Bubbles (straw in 1 to 1.5  of water)  2x/day for 1-2 min:  ? 3x: blow 10-15 seconds with no voice and keep bubbles consistent.  ? 3x: blow bubbles and add a sustained  who  or an  oo  (comfy pitch for you )  ? 3x: blow bubbles and vary  who  gliding up and down             Up and down like a sine wave  ? 3x: blow bubbles and glide from high to low  ? 3x: blow bubbles and \"engine rev\"   These exercises are great for:                                *warm up / cool down - Part of the morning routing and before and after extended voice use.                                *tissue mobilization exercise - Improving the condition and pliability of the vocal folds.                                *Abdominal breathing and applying optimal breath flow to speech/singing.    Voice Exercises:    \"Scratchy Voice\"   -Feels: vibrations, burning, dry   -Sounded : scratchy     \"Happy Voice\"   -Felt: no vibrations, felt open in your throat, better   -Sounded: clearer, louder       -Focus on taking breaths before you speak and while you speak. Take the extra second to " "take a nice low breath, before you start talking.     -Try to talk in the \"Happy voice\" during the day    -Find 1 opportunity during the day to read something or you could say the months of the year aloud and alternate between the Scratchy voice and the Happy voice     Next Appts:    Thursday Nov 14 at 1pm    Thursday Dec 5 at 1pm     DORA Finney (rehan), M.A., CFY-SLP  Speech Language Pathology Clinical Fellow  [facility] Trained Vocologist   [facility] Voice [facility]   [phone]  [email or web address]        "

## 2019-10-31 NOTE — PROGRESS NOTES
"After Visit Summary    Patient: Charan Pretty  Date of Visit: 10/31/2019    Hygiene:     Systemic Hydration: internal hydration of the entire body  ? Sip water throughout the day (staying within the limits the Liver Doc)       Topical Hydration: hydration for the surface mucosa of the larynx and vocal folds.    Please follow strategies learned on the \"Tips for Topical Hydration\" handout  ? Nasal Spray 1-2x/day (Saline Nasal Spray)    3 puffs in each nostril; sniff and then blow your nose  ? Gargling    Plain water or salt water at least 2x/day (or as much as you want)    Gargle with a voice    Gargle with a voice and tilt your head from side to side     Cough:   Trigger: Talking  Goal is to prevent the cough from happening and/or to shorten the duration of cough       Sip of water (cold, hot, carbonated)    Dry swallow    Chin tuck with a swallow      Gargle  ? With a voice  ? Tilt your head side to side    Angry  repeated \"sh sh sh sh\"    Suck on a lozenge with Pectin (avoid mint or menthol) or a sugar-free candy, gum, \"wet\" snacks (apples, pineapple, grapes, etc).  Also consider Xylitol products, like the Spry brand of lozenges, gum, spray    Wait \"urge surf\"        Voice (2-3x/day unless otherwise noted):    Semi-occluded vocal tract exercise:   ? Cup and Bubbles (straw in 1 to 1.5  of water) 2x/day for 1-2 min:  ? 3x: blow 10-15 seconds with no voice and keep bubbles consistent.  ? 3x: blow bubbles and add a sustained  who  or an  oo  (comfy pitch for you )  ? 3x: blow bubbles and vary  who  gliding up and down             Up and down like a sine wave  ? 3x: blow bubbles and glide from high to low  ? 3x: blow bubbles and \"engine rev\"   These exercises are great for:                                *warm up / cool down - Part of the morning routing and before and after extended voice use.                                *tissue mobilization exercise - Improving the condition and pliability of the " "vocal folds.                                *Abdominal breathing and applying optimal breath flow to speech/singing.    Voice Exercises:    \"Scratchy Voice\"   -Feels: vibrations, burning, dry   -Sounded : scratchy     \"Happy Voice\"   -Felt: no vibrations, felt open in your throat, better   -Sounded: clearer, louder       -Focus on taking breaths before you speak and while you speak. Take the extra second to take a nice low breath, before you start talking.     -Try to talk in the \"Happy voice\" during the day    -Find 1 opportunity during the day to read something or you could say the months of the year aloud and alternate between the Scratchy voice and the Happy voice     Next Appts:    Thursday Nov 14 at 1pm    Thursday Dec 5 at 1pm     DORA Finney (music), M.A., CFY-SLP  Speech Language Pathology Clinical Fellow  [place] Trained Vocologist   [facility]   [phone]  [email or web address]      "

## 2019-11-07 ENCOUNTER — OFFICE VISIT (OUTPATIENT)
Dept: PULMONOLOGY | Facility: CLINIC | Age: 64
End: 2019-11-07
Payer: COMMERCIAL

## 2019-11-07 ENCOUNTER — PRE VISIT (OUTPATIENT)
Dept: PULMONOLOGY | Facility: CLINIC | Age: 64
End: 2019-11-07

## 2019-11-07 VITALS
DIASTOLIC BLOOD PRESSURE: 76 MMHG | BODY MASS INDEX: 37.12 KG/M2 | HEART RATE: 82 BPM | SYSTOLIC BLOOD PRESSURE: 133 MMHG | OXYGEN SATURATION: 97 % | HEIGHT: 66 IN | WEIGHT: 231 LBS

## 2019-11-07 DIAGNOSIS — J43.2 CENTRILOBULAR EMPHYSEMA (H): ICD-10-CM

## 2019-11-07 DIAGNOSIS — J98.4 RESTRICTIVE LUNG DISEASE: Primary | ICD-10-CM

## 2019-11-07 PROCEDURE — G0463 HOSPITAL OUTPT CLINIC VISIT: HCPCS | Mod: 25,ZF

## 2019-11-07 ASSESSMENT — PAIN SCALES - GENERAL: PAINLEVEL: NO PAIN (0)

## 2019-11-07 ASSESSMENT — MIFFLIN-ST. JEOR: SCORE: 1777.62

## 2019-11-07 NOTE — NURSING NOTE
Chief Complaint   Patient presents with     New Patient     pre liver eval     Vitals were taken and medications were reconciled.     TUAN Elliott

## 2019-11-07 NOTE — LETTER
"11/7/2019       RE: Charan Pretty  [address]  [facility] [address]     Dear Colleague,    Thank you for referring your patient, Charan Pretty, to the [facility] at [facility]. Please see a copy of my visit note below.    [facility]  Pulmonary Medicine  Visit Clinic Note  November 7, 2019         ASSESSMENT & PLAN       Emphysema - A mild amount on his chest CT was noted.  He has a smoking history when he was younger. There is no airflow obstruction on his PFTs.  His diffusion capacity was low, but this could have been related to pulmonary edema and atelectasis since it sounds like he had the PFTs performed when he was massively volume overloaded.  I do not expect he is experiencing significant day to day symptoms from this.      Given his lung and liver disease, I will check an alpha 1 antitrypsin phenotype today.  Buccal swab testing.     Pre-operative Pulmonary Evaluation: He has good physical endurance, as he is walking about 2 miles a day.  His emphysema is mild.  There are no pulmonary contraindications for surgery.      There is a question about the hepatopulmonary syndrome.  He reportedly has a \"hole in the heart\" which I am assuming is a PFO. He is not hypoxemic on an ABG or pulse oximetry.  There is no orthodeoxia or cyanosis today on my exam.  I do not suspect he has the hepatopulmonary syndrome based on this.  He has a scheduled ECHO with a bubble study in a few days.  If there is evidence for an intrapulmonary shunt instead of an intracardiac shunt on that study, we may have to re-evaluate the possibility of pulmonary AVMs (Chest CT with AVM protocol or pulmonary angiogram).       Igor Christensen MD          Today's visit note:     Chief Complaint: Charan Pretty is a 64 year old year old male who is being seen for New Patient (pre liver eval)      HISTORY OF PRESENT ILLNESS:    This is a " 64-year-old male who is presenting to the pulmonary clinic today for evaluation of lung disease as a part of a liver transplant evaluation.    In the spring 2019 he started noticed significant shortness of breath with exertion.  He could only walk about 50 yards and then needed to stop.  He had an initial work-up done at a pulmonary clinic at [facility].  Pulmonary function  performed at that time showed restrictive lung disease and an impaired diffusion capacity.  At some point he went on to get a upper endoscopy performed, and he had what was described as a cardiac events on the table.  He was intubated and brought to the intensive care unit.  There were initial thoughts that he had a heart attack, but this was apparently ruled out.  The family said that he was diuresed about 50 pounds during that hospitalization, and since then he has been feeling much better.  He is also changed his diet.  He used to drink about 12 cans of soda a day.  He is completely stopped that.  He has started to lose weight.  Now he is able to walk about 2 miles a day.  He does have a cough, which started 3 years ago.  It sounds like he has been diagnosed with an irritable larynx.  The cough happens when he is talking.  He has been doing breathing exercises in order to try to improve this, and this is helped significantly.    When he left the hospital, he was on oxygen.  As part of the work-up for his liver disease, there  has been a question of hepatopulmonary syndrome.  He has a portable pulse oximeter that he regularly checks.  He will note oxygen saturations mostly in the 90% range.  At some points, while he is on his walk he may notice that oxygen saturation go down to 89%.  He is no longer using any oxygen during the day or at night.  He does use CPAP at night.  Denies any cyanosis.  Denies platypnea.    He used to smoke from about age [age].  He is smoking about 1 pack/day.  He had a mother with emphysema.  His father had  cirrhosis.           Past Medical and Surgical History:     Past Medical History:   Diagnosis Date     Cirrhosis of liver with ascites (H) 10/29/2019     Coronary artery disease     Heart Murmor      Depressive disorder      Hypertension      Obesity      Past Surgical History:   Procedure Laterality Date     COLONOSCOPY       GI SURGERY      Upper Endo at [facility]      ORTHOPEDIC SURGERY      foot surgery, 2011     PHACOEMULSIFICATION CLEAR CORNEA WITH TORIC INTRAOCULAR LENS IMPLANT  1/2/2013    Procedure: PHACOEMULSIFICATION CLEAR CORNEA WITH TORIC INTRAOCULAR LENS IMPLANT;  LEFT PHACOEMULSIFICATION CLEAR CORNEA WITH TORIC INTRAOCULAR LENS IMPLANT ;  Surgeon: Florian Hummel MD;  Location: [facility]     PHACOEMULSIFICATION CLEAR CORNEA WITH TORIC INTRAOCULAR LENS IMPLANT  1/16/2013    Procedure: PHACOEMULSIFICATION CLEAR CORNEA WITH TORIC INTRAOCULAR LENS IMPLANT;  RIGHT PHACOEMULSIFICATION CLEAR CORNEA WITH TORIC INTRAOCULAR LENS IMPLANT ;  Surgeon: Florian Hummel MD;  Location: [facility]           Family History:     Family History   Problem Relation Age of Onset     Cirrhosis Father               Social History:     Social History     Socioeconomic History     Marital status:      Spouse name: Not on file     Number of children: Not on file     Years of education: Not on file     Highest education level: Not on file   Occupational History     Not on file   Social Needs     Financial resource strain: Not on file     Food insecurity:     Worry: Not on file     Inability: Not on file     Transportation needs:     Medical: Not on file     Non-medical: Not on file   Tobacco Use     Smoking status: Former Smoker     Packs/day: 0.00     Smokeless tobacco: Never Used   Substance and Sexual Activity     Alcohol use: No     Comment: Sobriety, 1988     Drug use: Never     Sexual activity: Not on file   Lifestyle     Physical activity:     Days per week: Not on file     Minutes per session: Not on file     Stress:  "Not on file   Relationships     Social connections:     Talks on phone: Not on file     Gets together: Not on file     Attends Zoroastrianism service: Not on file     Active member of club or organization: Not on file     Attends meetings of clubs or organizations: Not on file     Relationship status: Not on file     Intimate partner violence:     Fear of current or ex partner: Not on file     Emotionally abused: Not on file     Physically abused: Not on file     Forced sexual activity: Not on file   Other Topics Concern     Parent/sibling w/ CABG, MI or angioplasty before 65F 55M? 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          Medications:     Current Outpatient Medications   Medication     Ferrous Gluconate 240 (27 Fe) MG TABS     furosemide (LASIX) 40 MG tablet     levothyroxine (SYNTHROID/LEVOTHROID) 25 MCG tablet     Metoprolol Succinate (TOPROL XL PO)     pantoprazole (PROTONIX) 40 MG EC tablet     potassium chloride ER (K-DUR/KLOR-CON M) 10 MEQ CR tablet     No current facility-administered medications for this visit.             Review of Systems:       A complete review of systems was otherwise negative except as noted in the HPI.      PHYSICAL EXAM:  /76   Pulse 82   Ht 1.664 m (5' 5.5\")   Wt 104.8 kg (231 lb)   SpO2 97%   BMI 37.86 kg/m        Oxygen saturation sitting up is 97%.  On room air.  Oxygen saturation supine is 93% on room air.      General: Pleasant, no apparent distress.  Eyes: Anicteric  Nose: Nasal mucosa with no edema or hyperemia.    Ears: Hearing grossly normal  Mouth: Oral mucosa is moist, without any lesions. No oropharyngeal exudate.  Neck: supple, no thyromegaly  Lymphatics: No cervical or supraclavicular nodes  Respiratory: Good air movement. No crackles. No rhonchi. No wheezes  Cardiac: RRR, normal S1, S2. No murmurs.   Abdomen: Soft, distended, NT  Musculoskeletal: Extremities normal. No " clubbing. No cyanosis.   Skin: No rash on limited exam, no appreciable angiomata  Neuro: Normal mentation. Normal speech.  Psych:Normal affect           Data:   All laboratory and imaging data reviewed.      AB/    PFT:   FVC is 2.43 L which is 62% predicted and improved to 64% predicted after administration of albuterol.  The FEV1 is 1.96 L which is 67% predicted, and improved to 2.19 L which is 75% predicted after administration of albuterol.  The residual volume is 88% predicted.  Total lung capacity is 68% predicted.  The diffusion capacity is 57% predicted.        PFT Interpretation:  No airflow obstruction. There is lung restriction and a low diffusion capacity.   Valid Maneuver         Chest CT: I have reviewed the chest CT images.  There are small pleural effusions. There is emphysema in the RUL.  No other pulmonary opacities.  No clear pulmonary AVMs.     Again, thank you for allowing me to participate in the care of your patient.      Sincerely,    Pal Christensen MD

## 2019-11-07 NOTE — NURSING NOTE
Overnight oximetry on RA faxed to [facility].  If results show pt does not need nocturnal O2, all O2 can be discontinued.

## 2019-11-07 NOTE — PROGRESS NOTES
"[facility]  Pulmonary Medicine  Visit Clinic Note  November 7, 2019         ASSESSMENT & PLAN       Emphysema - A mild amount on his chest CT was noted.  He has a smoking history when he was younger. There is no airflow obstruction on his PFTs.  His diffusion capacity was low, but this could have been related to pulmonary edema and atelectasis since it sounds like he had the PFTs performed when he was massively volume overloaded.  I do not expect he is experiencing significant day to day symptoms from this.      Given his lung and liver disease, I will check an alpha 1 antitrypsin phenotype today.  Buccal swab testing.     Pre-operative Pulmonary Evaluation: He has good physical endurance, as he is walking about 2 miles a day.  His emphysema is mild.  There are no pulmonary contraindications for surgery.      There is a question about the hepatopulmonary syndrome.  He reportedly has a \"hole in the heart\" which I am assuming is a PFO. He is not hypoxemic on an ABG or pulse oximetry.  There is no orthodeoxia or cyanosis today on my exam.  I do not suspect he has the hepatopulmonary syndrome based on this.  He has a scheduled ECHO with a bubble study in a few days.  If there is evidence for an intrapulmonary shunt instead of an intracardiac shunt on that study, we may have to re-evaluate the possibility of pulmonary AVMs (Chest CT with AVM protocol or pulmonary angiogram).       Igor Christensen MD          Today's visit note:     Chief Complaint: Charan Pretty is a 64 year old year old male who is being seen for New Patient (pre liver eval)      HISTORY OF PRESENT ILLNESS:    This is a 64-year-old male who is presenting to the pulmonary clinic today for evaluation of lung disease as a part of a liver transplant evaluation.    In the spring 2019 he started noticed significant shortness of breath with exertion.  He could only walk about 50 yards and then needed to stop.  He had an initial work-up " done at a pulmonary clinic at [facility].  Pulmonary function  performed at that time showed restrictive lung disease and an impaired diffusion capacity.  At some point he went on to get a upper endoscopy performed, and he had what was described as a cardiac events on the table.  He was intubated and brought to the intensive care unit.  There were initial thoughts that he had a heart attack, but this was apparently ruled out.  The family said that he was diuresed about 50 pounds during that hospitalization, and since then he has been feeling much better.  He is also changed his diet.  He used to drink about 12 cans of soda a day.  He is completely stopped that.  He has started to lose weight.  Now he is able to walk about 2 miles a day.  He does have a cough, which started 3 years ago.  It sounds like he has been diagnosed with an irritable larynx.  The cough happens when he is talking.  He has been doing breathing exercises in order to try to improve this, and this is helped significantly.    When he left the hospital, he was on oxygen.  As part of the work-up for his liver disease, there  has been a question of hepatopulmonary syndrome.  He has a portable pulse oximeter that he regularly checks.  He will note oxygen saturations mostly in the 90% range.  At some points, while he is on his walk he may notice that oxygen saturation go down to 89%.  He is no longer using any oxygen during the day or at night.  He does use CPAP at night.  Denies any cyanosis.  Denies platypnea.    He used to smoke from about age [age].  He is smoking about 1 pack/day.  He had a mother with emphysema.  His father had cirrhosis.           Past Medical and Surgical History:     Past Medical History:   Diagnosis Date     Cirrhosis of liver with ascites (H) 10/29/2019     Coronary artery disease     Heart Murmor      Depressive disorder      Hypertension      Obesity      Past Surgical History:   Procedure Laterality Date      COLONOSCOPY       GI SURGERY      Upper Endo at [facility]      ORTHOPEDIC SURGERY      foot surgery, 2011     PHACOEMULSIFICATION CLEAR CORNEA WITH TORIC INTRAOCULAR LENS IMPLANT  1/2/2013    Procedure: PHACOEMULSIFICATION CLEAR CORNEA WITH TORIC INTRAOCULAR LENS IMPLANT;  LEFT PHACOEMULSIFICATION CLEAR CORNEA WITH TORIC INTRAOCULAR LENS IMPLANT ;  Surgeon: Florian Hummel MD;  Location: [facility]     PHACOEMULSIFICATION CLEAR CORNEA WITH TORIC INTRAOCULAR LENS IMPLANT  1/16/2013    Procedure: PHACOEMULSIFICATION CLEAR CORNEA WITH TORIC INTRAOCULAR LENS IMPLANT;  RIGHT PHACOEMULSIFICATION CLEAR CORNEA WITH TORIC INTRAOCULAR LENS IMPLANT ;  Surgeon: Florian Hummel MD;  Location: [facility]           Family History:     Family History   Problem Relation Age of Onset     Cirrhosis Father               Social History:     Social History     Socioeconomic History     Marital status:      Spouse name: Not on file     Number of children: Not on file     Years of education: Not on file     Highest education level: Not on file   Occupational History     Not on file   Social Needs     Financial resource strain: Not on file     Food insecurity:     Worry: Not on file     Inability: Not on file     Transportation needs:     Medical: Not on file     Non-medical: Not on file   Tobacco Use     Smoking status: Former Smoker     Packs/day: 0.00     Smokeless tobacco: Never Used   Substance and Sexual Activity     Alcohol use: No     Comment: Sobriety, 1988     Drug use: Never     Sexual activity: Not on file   Lifestyle     Physical activity:     Days per week: Not on file     Minutes per session: Not on file     Stress: Not on file   Relationships     Social connections:     Talks on phone: Not on file     Gets together: Not on file     Attends Yazidi service: Not on file     Active member of club or organization: Not on file     Attends meetings of clubs or organizations: Not on file     Relationship status: Not on file  "    Intimate partner violence:     Fear of current or ex partner: Not on file     Emotionally abused: Not on file     Physically abused: Not on file     Forced sexual activity: Not on file   Other Topics Concern     Parent/sibling w/ CABG, MI or angioplasty before 65F 55M? Not Asked   Social History Narrative     Not on file     half-way work.  Also worked in a factory plating various metals.          Medications:     Current Outpatient Medications   Medication     Ferrous Gluconate 240 (27 Fe) MG TABS     furosemide (LASIX) 40 MG tablet     levothyroxine (SYNTHROID/LEVOTHROID) 25 MCG tablet     Metoprolol Succinate (TOPROL XL PO)     pantoprazole (PROTONIX) 40 MG EC tablet     potassium chloride ER (K-DUR/KLOR-CON M) 10 MEQ CR tablet     No current facility-administered medications for this visit.             Review of Systems:       A complete review of systems was otherwise negative except as noted in the HPI.      PHYSICAL EXAM:  /76   Pulse 82   Ht 1.664 m (5' 5.5\")   Wt 104.8 kg (231 lb)   SpO2 97%   BMI 37.86 kg/m       Oxygen saturation sitting up is 97%.  On room air.  Oxygen saturation supine is 93% on room air.      General: Pleasant, no apparent distress.  Eyes: Anicteric  Nose: Nasal mucosa with no edema or hyperemia.    Ears: Hearing grossly normal  Mouth: Oral mucosa is moist, without any lesions. No oropharyngeal exudate.  Neck: supple, no thyromegaly  Lymphatics: No cervical or supraclavicular nodes  Respiratory: Good air movement. No crackles. No rhonchi. No wheezes  Cardiac: RRR, normal S1, S2. No murmurs.   Abdomen: Soft, distended, NT  Musculoskeletal: Extremities normal. No clubbing. No cyanosis.   Skin: No rash on limited exam, no appreciable angiomata  Neuro: Normal mentation. Normal speech.  Psych:Normal affect           Data:   All laboratory and imaging data reviewed.      AB.44/36/84/24    PFT:   FVC is 2.43 L which is 62% predicted and improved to 64% predicted after " administration of albuterol.  The FEV1 is 1.96 L which is 67% predicted, and improved to 2.19 L which is 75% predicted after administration of albuterol.  The residual volume is 88% predicted.  Total lung capacity is 68% predicted.  The diffusion capacity is 57% predicted.        PFT Interpretation:  No airflow obstruction. There is lung restriction and a low diffusion capacity.   Valid Maneuver         Chest CT: I have reviewed the chest CT images.  There are small pleural effusions. There is emphysema in the RUL.  No other pulmonary opacities.  No clear pulmonary AVMs.

## 2019-11-10 PROBLEM — J98.4 RESTRICTIVE LUNG DISEASE: Status: ACTIVE | Noted: 2019-11-10

## 2019-11-10 PROBLEM — J43.2 CENTRILOBULAR EMPHYSEMA (H): Status: ACTIVE | Noted: 2019-11-10

## 2019-11-11 ENCOUNTER — ANCILLARY PROCEDURE (OUTPATIENT)
Dept: CARDIOLOGY | Facility: CLINIC | Age: 64
End: 2019-11-11
Attending: INTERNAL MEDICINE
Payer: COMMERCIAL

## 2019-11-11 ENCOUNTER — PRE VISIT (OUTPATIENT)
Dept: CARDIOLOGY | Facility: CLINIC | Age: 64
End: 2019-11-11

## 2019-11-11 VITALS
BODY MASS INDEX: 38.57 KG/M2 | SYSTOLIC BLOOD PRESSURE: 133 MMHG | WEIGHT: 240 LBS | OXYGEN SATURATION: 96 % | HEIGHT: 66 IN | DIASTOLIC BLOOD PRESSURE: 76 MMHG | HEART RATE: 77 BPM

## 2019-11-11 DIAGNOSIS — K76.81: ICD-10-CM

## 2019-11-11 DIAGNOSIS — J98.4 RESTRICTIVE LUNG DISEASE: ICD-10-CM

## 2019-11-11 DIAGNOSIS — K74.60 CIRRHOSIS OF LIVER WITH ASCITES, UNSPECIFIED HEPATIC CIRRHOSIS TYPE (H): ICD-10-CM

## 2019-11-11 DIAGNOSIS — K75.81 NASH (NONALCOHOLIC STEATOHEPATITIS): ICD-10-CM

## 2019-11-11 DIAGNOSIS — R18.8 CIRRHOSIS OF LIVER WITH ASCITES, UNSPECIFIED HEPATIC CIRRHOSIS TYPE (H): ICD-10-CM

## 2019-11-11 DIAGNOSIS — I10 ESSENTIAL HYPERTENSION: ICD-10-CM

## 2019-11-11 DIAGNOSIS — R06.00 DYSPNEA: ICD-10-CM

## 2019-11-11 DIAGNOSIS — I51.89 DIASTOLIC DYSFUNCTION: Primary | ICD-10-CM

## 2019-11-11 PROBLEM — K76.6 PORTAL HYPERTENSION (H): Status: ACTIVE | Noted: 2019-11-11

## 2019-11-11 LAB
DLCOCOR-%PRED-PRE: 102 %
DLCOCOR-PRE: 23.61 ML/MIN/MMHG
DLCOUNC-%PRED-PRE: 99 %
DLCOUNC-PRE: 23.05 ML/MIN/MMHG
DLCOUNC-PRED: 23.12 ML/MIN/MMHG
ERV-%PRED-PRE: 131 %
ERV-PRE: 0.37 L
ERV-PRED: 0.28 L
EXPTIME-PRE: 7.37 SEC
FEF2575-%PRED-PRE: 173 %
FEF2575-PRE: 4.3 L/SEC
FEF2575-PRED: 2.49 L/SEC
FEFMAX-%PRED-PRE: 103 %
FEFMAX-PRE: 8.17 L/SEC
FEFMAX-PRED: 7.9 L/SEC
FEV1-%PRED-PRE: 89 %
FEV1-PRE: 2.64 L
FEV1FEV6-PRE: 85 %
FEV1FEV6-PRED: 79 %
FEV1FVC-PRE: 85 %
FEV1FVC-PRED: 78 %
FEV1SVC-PRE: 73 %
FEV1SVC-PRED: 73 %
FIFMAX-PRE: 4.59 L/SEC
FRCPLETH-%PRED-PRE: 72 %
FRCPLETH-PRE: 2.43 L
FRCPLETH-PRED: 3.34 L
FVC-%PRED-PRE: 82 %
FVC-PRE: 3.11 L
FVC-PRED: 3.78 L
IC-%PRED-PRE: 85 %
IC-PRE: 3.22 L
IC-PRED: 3.76 L
RVPLETH-%PRED-PRE: 88 %
RVPLETH-PRE: 2.06 L
RVPLETH-PRED: 2.32 L
TLCPLETH-%PRED-PRE: 92 %
TLCPLETH-PRE: 5.65 L
TLCPLETH-PRED: 6.11 L
VA-%PRED-PRE: 96 %
VA-PRE: 5.25 L
VC-%PRED-PRE: 88 %
VC-PRE: 3.6 L
VC-PRED: 4.05 L

## 2019-11-11 PROCEDURE — 93010 ELECTROCARDIOGRAM REPORT: CPT | Mod: ZP | Performed by: INTERNAL MEDICINE

## 2019-11-11 PROCEDURE — 99204 OFFICE O/P NEW MOD 45 MIN: CPT | Mod: GC | Performed by: INTERNAL MEDICINE

## 2019-11-11 PROCEDURE — 93005 ELECTROCARDIOGRAM TRACING: CPT | Mod: ZF

## 2019-11-11 PROCEDURE — G0463 HOSPITAL OUTPT CLINIC VISIT: HCPCS | Mod: 25,ZF

## 2019-11-11 RX ORDER — ACYCLOVIR 200 MG/1
10 CAPSULE ORAL ONCE
Status: ACTIVE | OUTPATIENT
Start: 2019-11-11

## 2019-11-11 RX ADMIN — Medication 6 ML: at 08:15

## 2019-11-11 ASSESSMENT — ENCOUNTER SYMPTOMS
PANIC: 0
DYSPNEA ON EXERTION: 0
WHEEZING: 0
COUGH: 1
DEPRESSION: 0
NERVOUS/ANXIOUS: 1
INSOMNIA: 0
HEMOPTYSIS: 0
SNORES LOUDLY: 1
DECREASED CONCENTRATION: 0
SPUTUM PRODUCTION: 0
SHORTNESS OF BREATH: 0

## 2019-11-11 ASSESSMENT — PAIN SCALES - GENERAL: PAINLEVEL: NO PAIN (0)

## 2019-11-11 ASSESSMENT — MIFFLIN-ST. JEOR: SCORE: 1818.44

## 2019-11-11 NOTE — LETTER
11/11/2019      RE: Charan Pretty  [address]  [facility] [address]       Dear Colleague,    Thank you for the opportunity to participate in the care of your patient, Charan Pretty, at the [facility]. Please see a copy of my visit note below.    HPI:   65 y/o male with PMHx significant for MENDOZA cirrhosis, portal gastropathy complicated by EV (grade I), ?HPS, obesty and hyperlipidemia who comes in to be evaluated as part of liver transplant work up.     His cardiac history thus far is as follows. Patient was evaluated by Cardiology in 02/2018 because of chronic dyspnea on exertion. He had an exercise echocardiography text which was significant for oxygen saturation dropping from mid 90s% to 79% after achieving 8 METS.  Pulmonary work up including CT scan and PFTs were negative.     Patient was then admitted to the hospital on 06/2019 due to GI variceal bleed. These were banded and he was due to have further banding on 08/2019. During endoscopy procedure patient became unstable and required intubation. Imaging was concerning for pulmonary edema. He had an echocardiogram during the admission with positive bubble study. There was concern for possible PFO and/or hepatopulmonary syndrome as causing his shortness of breath and hypoxia. He was diuresed ~18kg and discharged.     Since diuresing he has been feeling well. States his shortness of breath has resolved. He walks about 3 miles without any issues and monitors his O2 sats with a smart watch. Denies desaturation events and O2 levels usually in the upper 90s%.     He recently saw hepatology and transplant team here for his ESLD. MELD score was 6. During that appointment his  PaO2 was 84 on ABG. There was some concern of HPS and etiology of shortness of breath, therefore referred to cardiology.     PAST MEDICAL HISTORY:  Past Medical History:   Diagnosis Date     Centrilobular emphysema  (H) 11/10/2019     Cirrhosis of liver with ascites (H) 10/29/2019     Coronary artery disease     Heart Murmor      Depressive disorder      Hypertension      Obesity      CURRENT MEDICATIONS:  Current Outpatient Medications   Medication Sig Dispense Refill     Ferrous Gluconate 240 (27 Fe) MG TABS        furosemide (LASIX) 40 MG tablet Take 40 mg by mouth       levothyroxine (SYNTHROID/LEVOTHROID) 25 MCG tablet Take 25 mcg by mouth Pt  Is taking 50 mcg on Tuesday and on fridays       Metoprolol Succinate (TOPROL XL PO) Take 50 mg by mouth daily.       pantoprazole (PROTONIX) 40 MG EC tablet Take 40 mg by mouth       potassium chloride ER (K-DUR/KLOR-CON M) 10 MEQ CR tablet Take 20 mEq by mouth       PAST SURGICAL HISTORY:  Past Surgical History:   Procedure Laterality Date     COLONOSCOPY       GI SURGERY      Upper Endo at [facility]      ORTHOPEDIC SURGERY      foot surgery, 2011     PHACOEMULSIFICATION CLEAR CORNEA WITH TORIC INTRAOCULAR LENS IMPLANT  1/2/2013    Procedure: PHACOEMULSIFICATION CLEAR CORNEA WITH TORIC INTRAOCULAR LENS IMPLANT;  LEFT PHACOEMULSIFICATION CLEAR CORNEA WITH TORIC INTRAOCULAR LENS IMPLANT ;  Surgeon: Florian Hummel MD;  [facility]     PHACOEMULSIFICATION CLEAR CORNEA WITH TORIC INTRAOCULAR LENS IMPLANT  1/16/2013    Procedure: PHACOEMULSIFICATION CLEAR CORNEA WITH TORIC INTRAOCULAR LENS IMPLANT;  RIGHT PHACOEMULSIFICATION CLEAR CORNEA WITH TORIC INTRAOCULAR LENS IMPLANT ;  Surgeon: Florian Hummel MD;  Location:  [facility]     ALLERGIES  Allergies   Allergen Reactions     Acetaminophen Nausea and Vomiting     Losartan Cough     FAMILY HISTORY:  Family History   Problem Relation Age of Onset     Cirrhosis Father      SOCIAL HISTORY:  Social History     Socioeconomic History     Marital status:      Spouse name: None     Number of children: None     Years of education: None     Highest education level: None   Occupational History     None   Social Needs     Financial  "resource strain: None     Food insecurity:     Worry: None     Inability: None     Transportation needs:     Medical: None     Non-medical: None   Tobacco Use     Smoking status: Former Smoker     Packs/day: 0.00     Smokeless tobacco: Never Used   Substance and Sexual Activity     Alcohol use: No     Comment: Sobriety, 1988     Drug use: Never     Sexual activity: None   Lifestyle     Physical activity:     Days per week: None     Minutes per session: None     Stress: None   Relationships     Social connections:     Talks on phone: None     Gets together: None     Attends Lutheran service: None     Active member of club or organization: None     Attends meetings of clubs or organizations: None     Relationship status: None     Intimate partner violence:     Fear of current or ex partner: None     Emotionally abused: None     Physically abused: None     Forced sexual activity: None   Other Topics Concern     Parent/sibling w/ CABG, MI or angioplasty before 65F 55M? Not Asked   Social History Narrative     None     ROS:   Constitutional: No fever, chills, or sweats. No weight gain/loss   ENT: No visual disturbance, ear ache, epistaxis, sore throat  Allergies/Immunologic: Negative.   Respiratory: No cough, hemoptysia  Cardiovascular: As per HPI  GI: No nausea, vomiting, hematemesis, melena, or hematochezia  : No urinary frequency, dysuria, or hematuria  Integument: Negative  Psychiatric: Negative  Neuro: Negative  Endocrinology: Negative   Musculoskeletal: Negative    EXAM:  /76 (BP Location: Right arm, Patient Position: Chair, Cuff Size: Adult Regular)   Pulse 77   Ht 1.664 m (5' 5.5\")   Wt 108.9 kg (240 lb)   SpO2 96%   BMI 39.33 kg/m     In general, the patient is a pleasant male in no apparent distress.      HEENT: NC/AT.  PERRLA.  EOMI.  Sclerae white, not injected.    Neck: Carotids 2+ bilaterally without bruits.  No jugular venous distension.   Lymph: No cervical adenopathy. No thyromegaly. "   Heart: RRR. Normal S1, S2. No murmur, rub, click, or gallop. There is no heave.    Lungs: Clear bilaterally.  No rhonchi, wheezes, rales.   GI: Soft, nontender, nondistended.   Extremities: No edema.  The pulses are 2+at the radial and DP bilaterally.  Neuro: grossly non focal.   Skin: no rashes.  Endocrine: no thyromegaly  Musculoskeletal: no joint swelling or tenderness, gait normal.  Psych: pleasant and conversant    Labs:  LIPID RESULTS:  Lab Results   Component Value Date    CHOL 153 10/28/2019    HDL 45 10/28/2019    LDL 80 10/28/2019    TRIG 138 10/28/2019    NHDL 107 10/28/2019     LIVER ENZYME RESULTS:  Lab Results   Component Value Date    AST 47 (H) 10/28/2019    ALT 59 10/28/2019     CBC RESULTS:  Lab Results   Component Value Date    WBC 5.7 10/28/2019    RBC 4.68 10/28/2019    HGB 13.8 10/28/2019    HCT 43.5 10/28/2019    MCV 93 10/28/2019    MCH 29.7 10/28/2019    MCHC 32.0 10/28/2019    RDW 13.6 10/28/2019    PLT 69 (L) 10/28/2019     BMP RESULTS:  Lab Results   Component Value Date     10/28/2019    POTASSIUM 3.8 10/28/2019    CHLORIDE 111 (H) 10/28/2019    CO2 28 10/28/2019    ANIONGAP 6 10/28/2019     (H) 10/28/2019    BUN 21 10/28/2019    CR 0.92 10/28/2019    GFRESTIMATED 88 10/28/2019    GFRESTBLACK >90 10/28/2019    SANDRA 8.9 10/28/2019      A1C RESULTS:  No results found for: A1C    INR RESULTS:  Lab Results   Component Value Date    INR 1.19 (H) 10/28/2019    INR 1.2 09/12/2019     Cardiac data:        Echocardiogram 11/11/2019  Left Ventricle  Global and regional left ventricular function is normal with an EF of 55-60%.  Left ventricular wall thickness is normal. Left ventricular size is normal.  Left ventricular diastolic function is indeterminate. No regional wall motion  abnormalities are seen.     Right Ventricle  Right ventricular function, chamber size, wall motion, and thickness are  normal.     Atria  The right atria appears normal. Mild to moderate left atrial  enlargement is  present. The atrial septum is intact as assessed by color Doppler and agitated  saline bubble study .     Mitral Valve  The mitral valve is normal. Trace mitral insufficiency is present.     Aortic Valve  The valve leaflets are not well visualized. On Doppler interrogation, there is  no significant stenosis or regurgitation. Trace aortic insufficiency is  present.     Tricuspid Valve  The tricuspid valve is normal. The peak velocity of the tricuspid regurgitant  jet is not obtainable. Pulmonary artery systolic pressure cannot be assessed.     Pulmonic Valve  The pulmonic valve is normal.     Vessels  The aorta root is normal. The thoracic aorta is normal. The inferior vena cava  was normal in size with preserved respiratory variability. The pulmonary  artery and bifurcation cannot be assessed. IVC diameter <2.1 cm collapsing  >50% with sniff suggests a normal RA pressure of 3 mmHg.    Echocardiogram 08/2019 (outside hospital)  Findings  Left Ventricle    The left ventricle is normal size. The left ventricular systolic function is  normal, estimated LVEF 60-65%.    x.    Left ventricular wall motion is normal.    There is no left ventricular hypertrophy.  Right Ventricle    The right ventricle is normal size.    Quantitatively reduced right ventricular systolic function.  Diastolic Function/Strain    The left ventricular diastolic function is normal.    Left Atrium    The left atrium is severely enlarged by quantification.    Right Atrium    The right atrium is enlarged.    Aortic Valve    The aortic valve is trileaflet.    There is no aortic valve sclerosis. There is no aortic stenosis.    There is mild aortic regurgitation.    Pulmonary Valve    The pulmonic valve is not well visualized.    There is no Doppler evidence of pulmonic valve sclerosis or stenosis.    There is mild pulmonic regurgitation.    Mitral Valve    The mitral valve leaflets are structurally normal.    There is borderline mitral  stenosis.    There is mild mitral regurgitation.    Tricuspid Valve    The tricuspid valve leaflets are structurally normal.    There is no significant tricuspid stenosis.    There is mild tricuspid regurgitation.    Hemodynamics    The inferior vena cava is dilated (> 2.1 cm), < 50% respiratory variance, RA  pressure elevated at 15 mmHg.    Mild pulmonary hypertension, estimated right ventricular systolic pressure  is 41 mmHg.    Septae    Agitated saline/dextrose study is positive, with evidence of intracardiac  shunt.    Pericardium/Pleura    There is no pericardial effusion.    Vessels    The aortic measurements are indexed to age and body surface area.    The aortic root is borderline dilated measuring 3.8 cm.    The proximal ascending aorta is normal in size measuring 3.1 cm.    Assessment and Plan:   #Possible HFpEF  #?Pulmonary HTN WHO I/III  #?HPS  Patient has been experiencing chronic shortness of breath for some time. Echocardiogram here shows LA enlargement and possible HFpEF, but does not fulfill all of the criteria. Systolic function is normal. No TR velocity could be evaluated to determine if elevated PA pressures are consistent with previous echo on 08/2019. Therefore it is unclear if patient indeed has true pulmonary hypertension caused by diastolic dysfunction and/or portal hypertension. Additionally, that echocardiogram in 08/2019 demonstrated positive bubble study concerning for intracardiac shunt and probable hepatopulmonary syndrome. Patient now comes in from hepatology and transplant clinic to ascertain if indeed hepatopulmonary syndrome is present which will then be an indication for transplant. During that visit ABG showed PaO2 of 84. Of note, he had a stress test in 2018 in which he became hypoxic after achieving 8 MET. It is unclear if patient does meet criteria for HPS. Our echocardiogram showed a negative bubble study there fore no observed intracardiac or extracardiac shunt. It is  unclear as to previous positive bubble study, but in any case it was read as intracardiac shunt via potential PFO. These two findings argue against HPS. Physical exam was also negative for platypnea-othodoxia, also against HPS. It is possible that his hypoxia and shortness of breath was due to some diastolic dysfunction as all is now normal following diuresis. He could indeed have some degree portopulmonary hypertension. In any case it would be feasible to have a RHC, LHC and coronary angiogram to assess potential factors causing his symptoms. Pulmonary work up including PFTs and imaging has been negative.   - RHC, LHC and coronary angiogram    Patient evaluated and discussed with Dr. Noreen Rock, [facility]  Cardiology Fellow    ATTENDING ATTESTATION:  This patient has been seen and examined by me November 11, 2019 with Jefferson Rock MD, cardiology fellow. I have reviewed the vitals, laboratory and imaging data relevant to this patient's care. I have edited this note to reflect our joint assessment and plan, and discussed the plan with the patient.  64 year old man who presents for liver transplant evaluation  Nonalcoholic steatopheatitis/Laënnec's cirrhosis,.  Hepatopulmonary syndrome   Hypoxia during MAC  Clinical heart failure requiring hospitalization   Family history of CHF  Past smoker  Patient reports a long-standing history of exertional dyspnea.  This initially led to a stress test in February 2018 when he had desaturation with exercise but no inducible ischemia.  He subsequently was diagnosed with bleeding esophageal varices in the Spring of this year and diagnosed with nonalcoholic cirrhosis.  He subsequently was admitted to the hospital with clinical heart failure and underwent large volume diuresis.  He has now been told by the liver transplant team that there is no urgent indication for transplantation.  His esophageal varices on relocate subsequently shrunk in size.  He has not had any  recurrent variceal bleeding.   Patient is not hypoxic at rest, has a PA O2 of 84.  His echocardiogram from today is notable for normal biventricular function with no evidence of shunt at the atrial level by color Doppler.  Bubble study is also negative and does not any indicate intrapulmonary shunt.  Based on the above, hepatopulmonary syndrome is unlikely in this individual.  He is no longer hypoxemic and he is euvolemic on exam today.  His blood pressure is well controlled.  He is in sinus rhythm with nonspecific T wave changes.  My plan for him is to undergo right heart catheterization and coronary angiogram to assess for pulmonary hypertension/diastolic dysfunction/volume status and exclude obstructive coronary artery disease.     Vanessa Sorto MD, MS  Staff Cardiologist  Pager: [phone]    CC  Patient Care Team:  Zenaida Shafer PA-C as PCP - General (Physician Assistant)  Ian Blackwood MD as MD (Internal Medicine-Hematology & Oncology)  ZENAIDA SHAFER

## 2019-11-11 NOTE — NURSING NOTE
Chief Complaint   Patient presents with     New Patient     65y/o male with MENDOZA possible DENISE cirrhosis with HPS requiring O2. PMH of HTN, ALLISON, and obesity. Evaluation and clearence for possible liver txp.      Vitals were taken and medications were reconciled. EKG was performed    Cassi DICKERSON  9:30 AM

## 2019-11-11 NOTE — PROGRESS NOTES
HPI:   63 y/o male with PMHx significant for MENDOZA cirrhosis, portal gastropathy complicated by EV (grade I), ?HPS, obesty and hyperlipidemia who comes in to be evaluated as part of liver transplant work up.     His cardiac history thus far is as follows. Patient was evaluated by Cardiology in 02/2018 because of chronic dyspnea on exertion. He had an exercise echocardiography text which was significant for oxygen saturation dropping from mid 90s% to 79% after achieving 8 METS.  Pulmonary work up including CT scan and PFTs were negative.     Patient was then admitted to the hospital on 06/2019 due to GI variceal bleed. These were banded and he was due to have further banding on 08/2019. During endoscopy procedure patient became unstable and required intubation. Imaging was concerning for pulmonary edema. He had an echocardiogram during the admission with positive bubble study. There was concern for possible PFO and/or hepatopulmonary syndrome as causing his shortness of breath and hypoxia. He was diuresed ~18kg and discharged.     Since diuresing he has been feeling well. States his shortness of breath has resolved. He walks about 3 miles without any issues and monitors his O2 sats with a smart watch. Denies desaturation events and O2 levels usually in the upper 90s%.     He recently saw hepatology and transplant team here for his ESLD. MELD score was 6. During that appointment his  PaO2 was 84 on ABG. There was some concern of HPS and etiology of shortness of breath, therefore referred to cardiology.     PAST MEDICAL HISTORY:  Past Medical History:   Diagnosis Date     Centrilobular emphysema (H) 11/10/2019     Cirrhosis of liver with ascites (H) 10/29/2019     Coronary artery disease     Heart Murmor      Depressive disorder      Hypertension      Obesity        CURRENT MEDICATIONS:  Current Outpatient Medications   Medication Sig Dispense Refill     Ferrous Gluconate 240 (27 Fe) MG TABS        furosemide (LASIX) 40  MG tablet Take 40 mg by mouth       levothyroxine (SYNTHROID/LEVOTHROID) 25 MCG tablet Take 25 mcg by mouth Pt  Is taking 50 mcg on Tuesday and on fridays       Metoprolol Succinate (TOPROL XL PO) Take 50 mg by mouth daily.       pantoprazole (PROTONIX) 40 MG EC tablet Take 40 mg by mouth       potassium chloride ER (K-DUR/KLOR-CON M) 10 MEQ CR tablet Take 20 mEq by mouth         PAST SURGICAL HISTORY:  Past Surgical History:   Procedure Laterality Date     COLONOSCOPY       GI SURGERY      Upper Endo at [facility]      ORTHOPEDIC SURGERY      foot surgery, 2011     PHACOEMULSIFICATION CLEAR CORNEA WITH TORIC INTRAOCULAR LENS IMPLANT  1/2/2013    Procedure: PHACOEMULSIFICATION CLEAR CORNEA WITH TORIC INTRAOCULAR LENS IMPLANT;  LEFT PHACOEMULSIFICATION CLEAR CORNEA WITH TORIC INTRAOCULAR LENS IMPLANT ;  Surgeon: Florian Hummel MD;  Location:  [facility]     PHACOEMULSIFICATION CLEAR CORNEA WITH TORIC INTRAOCULAR LENS IMPLANT  1/16/2013    Procedure: PHACOEMULSIFICATION CLEAR CORNEA WITH TORIC INTRAOCULAR LENS IMPLANT;  RIGHT PHACOEMULSIFICATION CLEAR CORNEA WITH TORIC INTRAOCULAR LENS IMPLANT ;  Surgeon: Florian Hummel MD;  Location:  [facility]       ALLERGIES     Allergies   Allergen Reactions     Acetaminophen Nausea and Vomiting     Losartan Cough       FAMILY HISTORY:  Family History   Problem Relation Age of Onset     Cirrhosis Father        SOCIAL HISTORY:  Social History     Socioeconomic History     Marital status:      Spouse name: None     Number of children: None     Years of education: None     Highest education level: None   Occupational History     None   Social Needs     Financial resource strain: None     Food insecurity:     Worry: None     Inability: None     Transportation needs:     Medical: None     Non-medical: None   Tobacco Use     Smoking status: Former Smoker     Packs/day: 0.00     Smokeless tobacco: Never Used   Substance and Sexual Activity     Alcohol use: No     Comment: Sobriety,  "1988     Drug use: Never     Sexual activity: None   Lifestyle     Physical activity:     Days per week: None     Minutes per session: None     Stress: None   Relationships     Social connections:     Talks on phone: None     Gets together: None     Attends Sabianism service: None     Active member of club or organization: None     Attends meetings of clubs or organizations: None     Relationship status: None     Intimate partner violence:     Fear of current or ex partner: None     Emotionally abused: None     Physically abused: None     Forced sexual activity: None   Other Topics Concern     Parent/sibling w/ CABG, MI or angioplasty before 65F 55M? Not Asked   Social History Narrative     None       ROS:   Constitutional: No fever, chills, or sweats. No weight gain/loss   ENT: No visual disturbance, ear ache, epistaxis, sore throat  Allergies/Immunologic: Negative.   Respiratory: No cough, hemoptysia  Cardiovascular: As per HPI  GI: No nausea, vomiting, hematemesis, melena, or hematochezia  : No urinary frequency, dysuria, or hematuria  Integument: Negative  Psychiatric: Negative  Neuro: Negative  Endocrinology: Negative   Musculoskeletal: Negative    EXAM:  /76 (BP Location: Right arm, Patient Position: Chair, Cuff Size: Adult Regular)   Pulse 77   Ht 1.664 m (5' 5.5\")   Wt 108.9 kg (240 lb)   SpO2 96%   BMI 39.33 kg/m    In general, the patient is a pleasant male in no apparent distress.      HEENT: NC/AT.  PERRLA.  EOMI.  Sclerae white, not injected.    Neck: Carotids 2+ bilaterally without bruits.  No jugular venous distension.   Lymph: No cervical adenopathy. No thyromegaly.   Heart: RRR. Normal S1, S2. No murmur, rub, click, or gallop. There is no heave.    Lungs: Clear bilaterally.  No rhonchi, wheezes, rales.   GI: Soft, nontender, nondistended.   Extremities: No edema.  The pulses are 2+at the radial and DP bilaterally.  Neuro: grossly non focal.   Skin: no rashes.  Endocrine: no " thyromegaly  Musculoskeletal: no joint swelling or tenderness, gait normal.  Psych: pleasant and conversant      Labs:  LIPID RESULTS:  Lab Results   Component Value Date    CHOL 153 10/28/2019    HDL 45 10/28/2019    LDL 80 10/28/2019    TRIG 138 10/28/2019    NHDL 107 10/28/2019       LIVER ENZYME RESULTS:  Lab Results   Component Value Date    AST 47 (H) 10/28/2019    ALT 59 10/28/2019       CBC RESULTS:  Lab Results   Component Value Date    WBC 5.7 10/28/2019    RBC 4.68 10/28/2019    HGB 13.8 10/28/2019    HCT 43.5 10/28/2019    MCV 93 10/28/2019    MCH 29.7 10/28/2019    MCHC 32.0 10/28/2019    RDW 13.6 10/28/2019    PLT 69 (L) 10/28/2019       BMP RESULTS:  Lab Results   Component Value Date     10/28/2019    POTASSIUM 3.8 10/28/2019    CHLORIDE 111 (H) 10/28/2019    CO2 28 10/28/2019    ANIONGAP 6 10/28/2019     (H) 10/28/2019    BUN 21 10/28/2019    CR 0.92 10/28/2019    GFRESTIMATED 88 10/28/2019    GFRESTBLACK >90 10/28/2019    SANDRA 8.9 10/28/2019        A1C RESULTS:  No results found for: A1C    INR RESULTS:  Lab Results   Component Value Date    INR 1.19 (H) 10/28/2019    INR 1.2 09/12/2019       Cardiac data:        Echocardiogram 11/11/2019  Left Ventricle  Global and regional left ventricular function is normal with an EF of 55-60%.  Left ventricular wall thickness is normal. Left ventricular size is normal.  Left ventricular diastolic function is indeterminate. No regional wall motion  abnormalities are seen.     Right Ventricle  Right ventricular function, chamber size, wall motion, and thickness are  normal.     Atria  The right atria appears normal. Mild to moderate left atrial enlargement is  present. The atrial septum is intact as assessed by color Doppler and agitated  saline bubble study .     Mitral Valve  The mitral valve is normal. Trace mitral insufficiency is present.        Aortic Valve  The valve leaflets are not well visualized. On Doppler interrogation, there is  no  significant stenosis or regurgitation. Trace aortic insufficiency is  present.     Tricuspid Valve  The tricuspid valve is normal. The peak velocity of the tricuspid regurgitant  jet is not obtainable. Pulmonary artery systolic pressure cannot be assessed.     Pulmonic Valve  The pulmonic valve is normal.     Vessels  The aorta root is normal. The thoracic aorta is normal. The inferior vena cava  was normal in size with preserved respiratory variability. The pulmonary  artery and bifurcation cannot be assessed. IVC diameter <2.1 cm collapsing  >50% with sniff suggests a normal RA pressure of 3 mmHg.      Echocardiogram 08/2019 (outside hospital)  Findings  Left Ventricle    The left ventricle is normal size. The left ventricular systolic function is  normal, estimated LVEF 60-65%.    x.    Left ventricular wall motion is normal.    There is no left ventricular hypertrophy.  Right Ventricle    The right ventricle is normal size.    Quantitatively reduced right ventricular systolic function.  Diastolic Function/Strain    The left ventricular diastolic function is normal.      Left Atrium    The left atrium is severely enlarged by quantification.    Right Atrium    The right atrium is enlarged.      Aortic Valve    The aortic valve is trileaflet.    There is no aortic valve sclerosis. There is no aortic stenosis.    There is mild aortic regurgitation.    Pulmonary Valve    The pulmonic valve is not well visualized.    There is no Doppler evidence of pulmonic valve sclerosis or stenosis.    There is mild pulmonic regurgitation.    Mitral Valve    The mitral valve leaflets are structurally normal.    There is borderline mitral stenosis.    There is mild mitral regurgitation.    Tricuspid Valve    The tricuspid valve leaflets are structurally normal.    There is no significant tricuspid stenosis.    There is mild tricuspid regurgitation.      Hemodynamics    The inferior vena cava is dilated (> 2.1 cm), < 50% respiratory  variance, RA  pressure elevated at 15 mmHg.    Mild pulmonary hypertension, estimated right ventricular systolic pressure  is 41 mmHg.    Septae    Agitated saline/dextrose study is positive, with evidence of intracardiac  shunt.    Pericardium/Pleura    There is no pericardial effusion.    Vessels    The aortic measurements are indexed to age and body surface area.    The aortic root is borderline dilated measuring 3.8 cm.    The proximal ascending aorta is normal in size measuring 3.1 cm.    Assessment and Plan:   #Possible HFpEF  #?Pulmonary HTN WHO I/III  #?HPS  Patient has been experiencing chronic shortness of breath for some time. Echocardiogram here shows LA enlargement and possible HFpEF, but does not fulfill all of the criteria. Systolic function is normal. No TR velocity could be evaluated to determine if elevated PA pressures are consistent with previous echo on 08/2019. Therefore it is unclear if patient indeed has true pulmonary hypertension caused by diastolic dysfunction and/or portal hypertension. Additionally, that echocardiogram in 08/2019 demonstrated positive bubble study concerning for intracardiac shunt and probable hepatopulmonary syndrome. Patient now comes in from hepatology and transplant clinic to ascertain if indeed hepatopulmonary syndrome is present which will then be an indication for transplant. During that visit ABG showed PaO2 of 84. Of note, he had a stress test in 2018 in which he became hypoxic after achieving 8 MET. It is unclear if patient does meet criteria for HPS. Our echocardiogram showed a negative bubble study there fore no observed intracardiac or extracardiac shunt. It is unclear as to previous positive bubble study, but in any case it was read as intracardiac shunt via potential PFO. These two findings argue against HPS. Physical exam was also negative for platypnea-othodoxia, also against HPS. It is possible that his hypoxia and shortness of breath was due to some  diastolic dysfunction as all is now normal following diuresis. He could indeed have some degree portopulmonary hypertension. In any case it would be feasible to have a RHC, LHC and coronary angiogram to assess potential factors causing his symptoms. Pulmonary work up including PFTs and imaging has been negative.   - RHC, LHC and coronary angiogram    Patient evaluated and discussed with Dr. Noreen Rock, [facility]  Cardiology Fellow      ATTENDING ATTESTATION:  This patient has been seen and examined by me November 11, 2019 with Jefferson Rock MD, cardiology fellow. I have reviewed the vitals, laboratory and imaging data relevant to this patient's care. I have edited this note to reflect our joint assessment and plan, and discussed the plan with the patient.    64 year old man who presents for liver transplant evaluation    Nonalcoholic steatopheatitis/Laënnec's cirrhosis,.  Hepatopulmonary syndrome   Hypoxia during MAC  Clinical heart failure requiring hospitalization   Family history of CHF  Past smoker    Patient reports a long-standing history of exertional dyspnea.  This initially led to a stress test in February 2018 when he had desaturation with exercise but no inducible ischemia.  He subsequently was diagnosed with bleeding esophageal varices in the Spring of this year and diagnosed with nonalcoholic cirrhosis.  He subsequently was admitted to the hospital with clinical heart failure and underwent large volume diuresis.  He has now been told by the liver transplant team that there is no urgent indication for transplantation.  His esophageal varices on relocate subsequently shrunk in size.  He has not had any recurrent variceal bleeding.     Patient is not hypoxic at rest, has a PA O2 of 84.  His echocardiogram from today is notable for normal biventricular function with no evidence of shunt at the atrial level by color Doppler.  Bubble study is also negative and does not any indicate intrapulmonary  shunt.  Based on the above, hepatopulmonary syndrome is unlikely in this individual.  He is no longer hypoxemic and he is euvolemic on exam today.  His blood pressure is well controlled.  He is in sinus rhythm with nonspecific T wave changes.    My plan for him is to undergo right heart catheterization and coronary angiogram to assess for pulmonary hypertension/diastolic dysfunction/volume status and exclude obstructive coronary artery disease.     Vanessa Sorto MD, MS  Staff Cardiologist  Pager: [phone]      Addendum  Patient underwent a coronary angiogram with a right heart catheterization on 11/22/2019.  The results are summarized below      Right sided filling pressures are normal.    Mild elevated Pulmonary Hypertension with a mean PA pressure of 24 mmHg, PVR 2.6, wedge pressure 12    Left sided filling pressures are normal.    Normal cardiac output level.     Normal coronaries  Evidence of myocardial bridging in LAD     Overall patient has mildly increased pulmonary vascular resistance but in light of normal cardiac output and PVR of less than 3 and a mean PA pressure less than 35 mmHg, patient is eligible to undergo liver transplantation.    Follow-up with us advised as needed.    Vanessa Sorto MD, MS  Staff Cardiologist, [facility]   Pager: [phone]    CC  Patient Care Team:  Zenaida Shafer PA-C as PCP - General (Physician Assistant)  Ian Blackwood MD as MD (Internal Medicine-Hematology & Oncology)  ZENAIDA SHAFER  Answers for HPI/ROS submitted by the patient on 11/11/2019   General Symptoms: No  Skin Symptoms: No  HENT Symptoms: No  EYE SYMPTOMS: No  HEART SYMPTOMS: No  LUNG SYMPTOMS: Yes  INTESTINAL SYMPTOMS: No  URINARY SYMPTOMS: No  REPRODUCTIVE SYMPTOMS: No  SKELETAL SYMPTOMS: No  BLOOD SYMPTOMS: No  NERVOUS SYSTEM SYMPTOMS: No  MENTAL HEALTH SYMPTOMS: Yes  Cough: Yes  Sputum or phlegm: No  Coughing up blood: No  Difficulty breating or shortness of breath: No  Snoring:  Yes  Wheezing: No  Difficulty breathing on exertion: No  Nervous or Anxious: Yes  Depression: No  Trouble sleeping: No  Trouble thinking or concentrating: No  Mood changes: No  Panic attacks: No

## 2019-11-11 NOTE — PATIENT INSTRUCTIONS
Patient Instructions:  It was a pleasure to see you in the cardiology clinic today.      If you have any questions, call  Aleshia Sanabria RN, at [phone].  Press Option #1 for the [facility], and then press Option #4  We are encouraging the use of [employer]fire[employer] to communicate with your HealthCare Provider    Note the new or changes to your medications: DECREASE Lasix to ONCE daily. DECREASE Potassium to 40 mEq daily.   Stop the following medications: None    The results from today include: NA  Please follow up with Dr. Sorto after angio, we will call with results and follow-up appt.       If you have an urgent need after hours (8:00 am to 4:30 pm) please call [phone] and ask for the cardiology fellow on call.

## 2019-11-12 LAB — INTERPRETATION ECG - MUSE: NORMAL

## 2019-11-21 DIAGNOSIS — R18.8 CIRRHOSIS OF LIVER WITH ASCITES (H): Primary | ICD-10-CM

## 2019-11-21 DIAGNOSIS — K74.60 CIRRHOSIS OF LIVER WITH ASCITES (H): Primary | ICD-10-CM

## 2019-11-21 DIAGNOSIS — K75.81 NASH (NONALCOHOLIC STEATOHEPATITIS): ICD-10-CM

## 2019-11-22 ENCOUNTER — APPOINTMENT (OUTPATIENT)
Dept: LAB | Facility: CLINIC | Age: 64
End: 2019-11-22
Attending: INTERNAL MEDICINE
Payer: COMMERCIAL

## 2019-11-22 ENCOUNTER — APPOINTMENT (OUTPATIENT)
Dept: MEDSURG UNIT | Facility: CLINIC | Age: 64
End: 2019-11-22
Attending: INTERNAL MEDICINE
Payer: COMMERCIAL

## 2019-11-22 ENCOUNTER — HOSPITAL ENCOUNTER (OUTPATIENT)
Facility: CLINIC | Age: 64
Discharge: HOME | End: 2019-11-22
Attending: INTERNAL MEDICINE | Admitting: INTERNAL MEDICINE
Payer: COMMERCIAL

## 2019-11-22 VITALS
SYSTOLIC BLOOD PRESSURE: 124 MMHG | OXYGEN SATURATION: 96 % | DIASTOLIC BLOOD PRESSURE: 69 MMHG | RESPIRATION RATE: 16 BRPM | HEART RATE: 62 BPM | TEMPERATURE: 97.8 F

## 2019-11-22 DIAGNOSIS — Z98.890 S/P CORONARY ANGIOGRAM: Primary | ICD-10-CM

## 2019-11-22 DIAGNOSIS — K76.6 PORTAL HYPERTENSION (H): ICD-10-CM

## 2019-11-22 LAB
ANION GAP SERPL CALCULATED.3IONS-SCNC: 7 MMOL/L (ref 3–14)
BUN SERPL-MCNC: 15 MG/DL (ref 7–30)
CALCIUM SERPL-MCNC: 8.8 MG/DL (ref 8.5–10.1)
CHLORIDE SERPL-SCNC: 107 MMOL/L (ref 94–109)
CO2 SERPL-SCNC: 28 MMOL/L (ref 20–32)
CREAT SERPL-MCNC: 1.02 MG/DL (ref 0.66–1.25)
ERYTHROCYTE [DISTWIDTH] IN BLOOD BY AUTOMATED COUNT: 13.9 % (ref 10–15)
GFR SERPL CREATININE-BSD FRML MDRD: 78 ML/MIN/{1.73_M2}
GLUCOSE SERPL-MCNC: 96 MG/DL (ref 70–99)
HCT VFR BLD AUTO: 41.3 % (ref 40–53)
HGB BLD-MCNC: 13.2 G/DL (ref 13.3–17.7)
MCH RBC QN AUTO: 29.6 PG (ref 26.5–33)
MCHC RBC AUTO-ENTMCNC: 32 G/DL (ref 31.5–36.5)
MCV RBC AUTO: 93 FL (ref 78–100)
PLATELET # BLD AUTO: 61 10E9/L (ref 150–450)
POTASSIUM SERPL-SCNC: 4.3 MMOL/L (ref 3.4–5.3)
RBC # BLD AUTO: 4.46 10E12/L (ref 4.4–5.9)
SODIUM SERPL-SCNC: 142 MMOL/L (ref 133–144)
WBC # BLD AUTO: 4.7 10E9/L (ref 4–11)

## 2019-11-22 PROCEDURE — 25000128 H RX IP 250 OP 636: Performed by: INTERNAL MEDICINE

## 2019-11-22 PROCEDURE — 93010 ELECTROCARDIOGRAM REPORT: CPT | Performed by: INTERNAL MEDICINE

## 2019-11-22 PROCEDURE — 99152 MOD SED SAME PHYS/QHP 5/>YRS: CPT | Performed by: INTERNAL MEDICINE

## 2019-11-22 PROCEDURE — 25000125 ZZHC RX 250: Performed by: INTERNAL MEDICINE

## 2019-11-22 PROCEDURE — 27210794 ZZH OR GENERAL SUPPLY STERILE: Performed by: INTERNAL MEDICINE

## 2019-11-22 PROCEDURE — 93456 R HRT CORONARY ARTERY ANGIO: CPT | Performed by: INTERNAL MEDICINE

## 2019-11-22 PROCEDURE — 25000132 ZZH RX MED GY IP 250 OP 250 PS 637: Performed by: NURSE PRACTITIONER

## 2019-11-22 PROCEDURE — 40000172 ZZH STATISTIC PROCEDURE PREP ONLY

## 2019-11-22 PROCEDURE — 99153 MOD SED SAME PHYS/QHP EA: CPT | Performed by: INTERNAL MEDICINE

## 2019-11-22 PROCEDURE — 93456 R HRT CORONARY ARTERY ANGIO: CPT | Mod: 26 | Performed by: INTERNAL MEDICINE

## 2019-11-22 PROCEDURE — 85027 COMPLETE CBC AUTOMATED: CPT | Performed by: NURSE PRACTITIONER

## 2019-11-22 PROCEDURE — 80048 BASIC METABOLIC PNL TOTAL CA: CPT | Performed by: NURSE PRACTITIONER

## 2019-11-22 PROCEDURE — 36415 COLL VENOUS BLD VENIPUNCTURE: CPT | Performed by: NURSE PRACTITIONER

## 2019-11-22 PROCEDURE — 40000065 ZZH STATISTIC EKG NON-CHARGEABLE

## 2019-11-22 PROCEDURE — C1894 INTRO/SHEATH, NON-LASER: HCPCS | Performed by: INTERNAL MEDICINE

## 2019-11-22 RX ORDER — POTASSIUM CHLORIDE 750 MG/1
40 TABLET, EXTENDED RELEASE ORAL
Status: DISCONTINUED | OUTPATIENT
Start: 2019-11-22 | End: 2019-11-22 | Stop reason: HOSPADM

## 2019-11-22 RX ORDER — IOPAMIDOL 755 MG/ML
INJECTION, SOLUTION INTRAVASCULAR
Status: DISCONTINUED | OUTPATIENT
Start: 2019-11-22 | End: 2019-11-22 | Stop reason: HOSPADM

## 2019-11-22 RX ORDER — DOPAMINE HYDROCHLORIDE 160 MG/100ML
2-20 INJECTION, SOLUTION INTRAVENOUS CONTINUOUS PRN
Status: DISCONTINUED | OUTPATIENT
Start: 2019-11-22 | End: 2019-11-22 | Stop reason: HOSPADM

## 2019-11-22 RX ORDER — FENTANYL CITRATE 50 UG/ML
25-50 INJECTION, SOLUTION INTRAMUSCULAR; INTRAVENOUS
Status: DISCONTINUED | OUTPATIENT
Start: 2019-11-22 | End: 2019-11-22 | Stop reason: HOSPADM

## 2019-11-22 RX ORDER — LIDOCAINE 40 MG/G
CREAM TOPICAL
Status: DISCONTINUED | OUTPATIENT
Start: 2019-11-22 | End: 2019-11-22 | Stop reason: HOSPADM

## 2019-11-22 RX ORDER — HEPARIN SODIUM 10000 [USP'U]/100ML
100-1000 INJECTION, SOLUTION INTRAVENOUS CONTINUOUS PRN
Status: DISCONTINUED | OUTPATIENT
Start: 2019-11-22 | End: 2019-11-22 | Stop reason: HOSPADM

## 2019-11-22 RX ORDER — NOREPINEPHRINE BITARTRATE 0.06 MG/ML
.03-.4 INJECTION, SOLUTION INTRAVENOUS CONTINUOUS PRN
Status: DISCONTINUED | OUTPATIENT
Start: 2019-11-22 | End: 2019-11-22 | Stop reason: HOSPADM

## 2019-11-22 RX ORDER — NALOXONE HYDROCHLORIDE 0.4 MG/ML
.1-.4 INJECTION, SOLUTION INTRAMUSCULAR; INTRAVENOUS; SUBCUTANEOUS
Status: DISCONTINUED | OUTPATIENT
Start: 2019-11-22 | End: 2019-11-22 | Stop reason: HOSPADM

## 2019-11-22 RX ORDER — EPTIFIBATIDE 2 MG/ML
180 INJECTION, SOLUTION INTRAVENOUS EVERY 10 MIN PRN
Status: DISCONTINUED | OUTPATIENT
Start: 2019-11-22 | End: 2019-11-22 | Stop reason: HOSPADM

## 2019-11-22 RX ORDER — FENTANYL CITRATE 50 UG/ML
INJECTION, SOLUTION INTRAMUSCULAR; INTRAVENOUS
Status: DISCONTINUED | OUTPATIENT
Start: 2019-11-22 | End: 2019-11-22 | Stop reason: HOSPADM

## 2019-11-22 RX ORDER — ARGATROBAN 1 MG/ML
150 INJECTION, SOLUTION INTRAVENOUS
Status: DISCONTINUED | OUTPATIENT
Start: 2019-11-22 | End: 2019-11-22 | Stop reason: HOSPADM

## 2019-11-22 RX ORDER — ATROPINE SULFATE 0.1 MG/ML
0.5 INJECTION INTRAVENOUS EVERY 5 MIN PRN
Status: DISCONTINUED | OUTPATIENT
Start: 2019-11-22 | End: 2019-11-22 | Stop reason: HOSPADM

## 2019-11-22 RX ORDER — EPTIFIBATIDE 2 MG/ML
2 INJECTION, SOLUTION INTRAVENOUS CONTINUOUS PRN
Status: DISCONTINUED | OUTPATIENT
Start: 2019-11-22 | End: 2019-11-22 | Stop reason: HOSPADM

## 2019-11-22 RX ORDER — SODIUM CHLORIDE 9 MG/ML
INJECTION, SOLUTION INTRAVENOUS CONTINUOUS
Status: DISCONTINUED | OUTPATIENT
Start: 2019-11-22 | End: 2019-11-22 | Stop reason: HOSPADM

## 2019-11-22 RX ORDER — POTASSIUM CHLORIDE 750 MG/1
20 TABLET, EXTENDED RELEASE ORAL
Status: DISCONTINUED | OUTPATIENT
Start: 2019-11-22 | End: 2019-11-22 | Stop reason: HOSPADM

## 2019-11-22 RX ORDER — NALOXONE HYDROCHLORIDE 0.4 MG/ML
.2-.4 INJECTION, SOLUTION INTRAMUSCULAR; INTRAVENOUS; SUBCUTANEOUS
Status: DISCONTINUED | OUTPATIENT
Start: 2019-11-22 | End: 2019-11-22 | Stop reason: HOSPADM

## 2019-11-22 RX ORDER — NITROGLYCERIN 20 MG/100ML
.07-1.84 INJECTION INTRAVENOUS CONTINUOUS PRN
Status: DISCONTINUED | OUTPATIENT
Start: 2019-11-22 | End: 2019-11-22 | Stop reason: HOSPADM

## 2019-11-22 RX ORDER — DOBUTAMINE HYDROCHLORIDE 200 MG/100ML
2-20 INJECTION INTRAVENOUS CONTINUOUS PRN
Status: DISCONTINUED | OUTPATIENT
Start: 2019-11-22 | End: 2019-11-22 | Stop reason: HOSPADM

## 2019-11-22 RX ORDER — ARGATROBAN 1 MG/ML
350 INJECTION, SOLUTION INTRAVENOUS
Status: DISCONTINUED | OUTPATIENT
Start: 2019-11-22 | End: 2019-11-22 | Stop reason: HOSPADM

## 2019-11-22 RX ORDER — FLUMAZENIL 0.1 MG/ML
0.2 INJECTION, SOLUTION INTRAVENOUS
Status: DISCONTINUED | OUTPATIENT
Start: 2019-11-22 | End: 2019-11-22 | Stop reason: HOSPADM

## 2019-11-22 RX ADMIN — ASPIRIN 325 MG: 325 TABLET, DELAYED RELEASE ORAL at 12:38

## 2019-11-22 NOTE — PRE-PROCEDURE
GENERAL PRE-PROCEDURE:   Procedure:  Right and left heart catheterization, coronary angiogram with possible percutaneous coronary intervention  Date/Time:  11/22/2019 12:46 PM    Verbal consent obtained?: Yes    Written consent obtained?: Yes    Risks and benefits: Risks, benefits and alternatives were discussed    Consent given by:  Patient  Patient states understanding of procedure being performed: Yes    Patient's understanding of procedure matches consent: Yes    Procedure consent matches procedure scheduled: Yes    Expected level of sedation:  Moderate  Appropriately NPO:  Yes  ASA Class:  Class 3- Severe systemic disease, definite functional limitations  Mallampati  :  Grade 3- soft palate visible, posterior pharyngeal wall not visible  Lungs:  Lungs clear with good breath sounds bilaterally  Heart:  Normal heart sounds and rate  History & Physical reviewed:  History and physical reviewed and no updates needed  Statement of review:  I have reviewed the lab findings, diagnostic data, medications, and the plan for sedation    Elizabeth MORENO, NP-C

## 2019-11-22 NOTE — DISCHARGE INSTRUCTIONS
Heart Care     CORONARY ANGIOGRAM DISCHARGE INSTRUCTIONS    AFTER YOU GO HOME:    DO NOT drive or operate machinery at home or at work for at least 24 hours.    We recommend that an adult stay with you for 24 hours.    DO relax and take it easy for 24 hours.    DO drink plenty of fluids.    DO resume your regular diet, unless otherwise instructed by your Primary Physician.    DO NOT drink alcoholic beverages the day of your procedure.    DO NOT do any strenuous exercise or lifting (>10 lbs) for 10 days following your procedure.    DO NOT take a tub bath, get in a hot tub, or pool for at least 7 days following your procedure.    Do NOT scrub the procedure site vigorously.    DO NOT make any important or legal decisions for 24 hours following your procedure.    CALL YOUR PRIMARY PHYSICIAN IF:    You start bleeding from the procedure site. If you do start to bleed from that site, lie down flat and hold pressure on the site. Your physician will tell you if you need to return to the hospital. It is common to have a small bruise or lump at the site.    You have numbness, coolness or change in color of the arm or leg of the puncture site.    You experience pain or redness at the puncture site.    You develop hives or a rash or unexplained itching.    You develop a temperature of 101 degrees F or greater.

## 2019-11-23 LAB — INTERPRETATION ECG - MUSE: NORMAL

## 2019-11-23 NOTE — PROGRESS NOTES
Patient finished bedrest with no complications. Right groin site CDI and CMS intact. Pedal pulses within normal limits. IV was removed. Pt walked the unit, ate and voided with no complications. Pt was instructed to get dressed and nurse would be back with discharge paperwork. When nurse came back 10 minutes later, patient and wife were gone. Writer attempted to reach patient on his cell phone to have him come back. No answer so voicemail was left for patient to return to unit or call back to go over discharge instructions. At time of note, patient had not returned call.

## 2019-11-24 ENCOUNTER — TRANSFERRED RECORDS (OUTPATIENT)
Dept: HEALTH INFORMATION MANAGEMENT | Facility: CLINIC | Age: 64
End: 2019-11-24

## 2019-11-25 DIAGNOSIS — J98.4 RESTRICTIVE LUNG DISEASE: Primary | ICD-10-CM

## 2019-11-26 LAB — MISCELLANEOUS TEST: NORMAL

## 2019-12-11 ENCOUNTER — TELEPHONE (OUTPATIENT)
Dept: PULMONOLOGY | Facility: CLINIC | Age: 64
End: 2019-12-11

## 2019-12-11 DIAGNOSIS — R09.02 HYPOXIA: Primary | ICD-10-CM

## 2019-12-11 NOTE — TELEPHONE ENCOUNTER
Pt called back re overnight oximetry.  Results indicate nocturnal oxygen not needed.  Order to discontinue oxygen awaiting provider signature.  Fax to [facility] when signed.

## 2019-12-11 NOTE — TELEPHONE ENCOUNTER
Contacted lab for alpha-1 results, lab confirm MM.  Left message for pt to return call to clinic to advise normal result.

## 2019-12-23 DIAGNOSIS — K75.81 NASH (NONALCOHOLIC STEATOHEPATITIS): Primary | ICD-10-CM

## 2019-12-30 LAB — LAB SCANNED RESULT: NORMAL

## 2020-01-06 ENCOUNTER — PRE VISIT (OUTPATIENT)
Dept: PULMONOLOGY | Facility: CLINIC | Age: 65
End: 2020-01-06

## 2020-01-07 ENCOUNTER — TELEPHONE (OUTPATIENT)
Dept: GASTROENTEROLOGY | Facility: CLINIC | Age: 65
End: 2020-01-07

## 2020-01-10 ENCOUNTER — OFFICE VISIT (OUTPATIENT)
Dept: GASTROENTEROLOGY | Facility: CLINIC | Age: 65
End: 2020-01-10
Attending: INTERNAL MEDICINE
Payer: COMMERCIAL

## 2020-01-10 VITALS
HEART RATE: 73 BPM | HEIGHT: 66 IN | SYSTOLIC BLOOD PRESSURE: 158 MMHG | TEMPERATURE: 98.2 F | WEIGHT: 236.8 LBS | DIASTOLIC BLOOD PRESSURE: 67 MMHG | BODY MASS INDEX: 38.06 KG/M2 | OXYGEN SATURATION: 95 %

## 2020-01-10 DIAGNOSIS — K75.81 NASH (NONALCOHOLIC STEATOHEPATITIS): ICD-10-CM

## 2020-01-10 DIAGNOSIS — K74.60 CIRRHOSIS OF LIVER WITH ASCITES, UNSPECIFIED HEPATIC CIRRHOSIS TYPE (H): Primary | ICD-10-CM

## 2020-01-10 DIAGNOSIS — R18.8 CIRRHOSIS OF LIVER WITH ASCITES, UNSPECIFIED HEPATIC CIRRHOSIS TYPE (H): Primary | ICD-10-CM

## 2020-01-10 LAB
ALBUMIN SERPL-MCNC: 3.6 G/DL (ref 3.4–5)
ALP SERPL-CCNC: 82 U/L (ref 40–150)
ALT SERPL W P-5'-P-CCNC: 52 U/L (ref 0–70)
ANION GAP SERPL CALCULATED.3IONS-SCNC: 6 MMOL/L (ref 3–14)
AST SERPL W P-5'-P-CCNC: 42 U/L (ref 0–45)
BILIRUB DIRECT SERPL-MCNC: 0.4 MG/DL (ref 0–0.2)
BILIRUB SERPL-MCNC: 1.6 MG/DL (ref 0.2–1.3)
BUN SERPL-MCNC: 18 MG/DL (ref 7–30)
CALCIUM SERPL-MCNC: 9 MG/DL (ref 8.5–10.1)
CHLORIDE SERPL-SCNC: 109 MMOL/L (ref 94–109)
CO2 SERPL-SCNC: 27 MMOL/L (ref 20–32)
CREAT SERPL-MCNC: 1.01 MG/DL (ref 0.66–1.25)
ERYTHROCYTE [DISTWIDTH] IN BLOOD BY AUTOMATED COUNT: 13.2 % (ref 10–15)
GFR SERPL CREATININE-BSD FRML MDRD: 79 ML/MIN/{1.73_M2}
GLUCOSE SERPL-MCNC: 125 MG/DL (ref 70–99)
HCT VFR BLD AUTO: 43.5 % (ref 40–53)
HGB BLD-MCNC: 14.3 G/DL (ref 13.3–17.7)
INR PPP: 1.23 (ref 0.86–1.14)
MCH RBC QN AUTO: 30.6 PG (ref 26.5–33)
MCHC RBC AUTO-ENTMCNC: 32.9 G/DL (ref 31.5–36.5)
MCV RBC AUTO: 93 FL (ref 78–100)
PLATELET # BLD AUTO: 62 10E9/L (ref 150–450)
POTASSIUM SERPL-SCNC: 4 MMOL/L (ref 3.4–5.3)
PROT SERPL-MCNC: 7.8 G/DL (ref 6.8–8.8)
RBC # BLD AUTO: 4.67 10E12/L (ref 4.4–5.9)
SODIUM SERPL-SCNC: 142 MMOL/L (ref 133–144)
WBC # BLD AUTO: 5.3 10E9/L (ref 4–11)

## 2020-01-10 PROCEDURE — 80076 HEPATIC FUNCTION PANEL: CPT | Performed by: INTERNAL MEDICINE

## 2020-01-10 PROCEDURE — 85027 COMPLETE CBC AUTOMATED: CPT | Performed by: INTERNAL MEDICINE

## 2020-01-10 PROCEDURE — 80048 BASIC METABOLIC PNL TOTAL CA: CPT | Performed by: INTERNAL MEDICINE

## 2020-01-10 PROCEDURE — 36415 COLL VENOUS BLD VENIPUNCTURE: CPT | Performed by: INTERNAL MEDICINE

## 2020-01-10 PROCEDURE — G0463 HOSPITAL OUTPT CLINIC VISIT: HCPCS | Mod: ZF

## 2020-01-10 PROCEDURE — 85610 PROTHROMBIN TIME: CPT | Performed by: INTERNAL MEDICINE

## 2020-01-10 RX ORDER — POTASSIUM CHLORIDE 750 MG/1
10 TABLET, EXTENDED RELEASE ORAL
COMMUNITY
Start: 2019-12-09 | End: 2020-01-10

## 2020-01-10 ASSESSMENT — MIFFLIN-ST. JEOR: SCORE: 1803.93

## 2020-01-10 ASSESSMENT — PAIN SCALES - GENERAL: PAINLEVEL: NO PAIN (0)

## 2020-01-10 NOTE — LETTER
"1/10/2020       RE: Charan Pretty  [address]  [facility] [address]     Dear Colleague,    Thank you for referring your patient, Charan Pretty, to the [facility] HEPATOLOGY at [facility]. Please see a copy of my visit note below.    I had the pleasure of seeing Charan Pretty for followup in the Liver Clinic at the [facility] on 01/10/2020.  Mr. Pretty returns for followup after having undergone his pretransplant evaluation.  He is not listed for liver transplantation.  His MELD score remains quite low.  He really does not experience any significant complications.      He continues to do well.  He denies any abdominal pain, itching, skin rash or fatigue.  He denies any increased abdominal girth or lower extremity edema.  He denies any fevers or chills, cough or shortness of breath.  He denies any nausea or vomiting, diarrhea or constipation.  His appetite has been good and his weight has been stable.  There have been no other new events since he was last seen.     Current Outpatient Medications   Medication     Ferrous Gluconate 240 (27 Fe) MG TABS     furosemide (LASIX) 40 MG tablet     levothyroxine (SYNTHROID/LEVOTHROID) 25 MCG tablet     pantoprazole (PROTONIX) 40 MG EC tablet     potassium chloride ER (K-DUR/KLOR-CON M) 10 MEQ CR tablet     Metoprolol Succinate (TOPROL XL PO)     order for DME     No current facility-administered medications for this visit.      Facility-Administered Medications Ordered in Other Visits   Medication     sodium chloride (PF) 0.9% PF flush 10 mL     sodium chloride bacteriostatic 0.9 % flush 10 mL     BP (!) 158/67   Pulse 73   Temp 98.2  F (36.8  C) (Oral)   Ht 1.664 m (5' 5.5\")   Wt 107.4 kg (236 lb 12.8 oz)   SpO2 95%   BMI 38.81 kg/m       EXAMINATION:  In general, he looks quite well.  HEENT exam shows no scleral icterus or temporal muscle wasting.  His chest is clear.  His " abdominal exam shows no increase in girth.  No masses or tenderness to palpation are present.  His liver is 10 cm in span without left lobe enlargement.  No spleen tip is palpable and extremity exam shows no edema.  Skin exam shows no stigmata of chronic liver disease.  Neurologic exam shows no asterixis.     Recent Results (from the past 168 hour(s))   INR    Collection Time: 01/10/20  8:45 AM   Result Value Ref Range    INR 1.23 (H) 0.86 - 1.14   Hepatic panel    Collection Time: 01/10/20  8:45 AM   Result Value Ref Range    Bilirubin Direct 0.4 (H) 0.0 - 0.2 mg/dL    Bilirubin Total 1.6 (H) 0.2 - 1.3 mg/dL    Albumin 3.6 3.4 - 5.0 g/dL    Protein Total 7.8 6.8 - 8.8 g/dL    Alkaline Phosphatase 82 40 - 150 U/L    ALT 52 0 - 70 U/L    AST 42 0 - 45 U/L   Basic metabolic panel    Collection Time: 01/10/20  8:45 AM   Result Value Ref Range    Sodium 142 133 - 144 mmol/L    Potassium 4.0 3.4 - 5.3 mmol/L    Chloride 109 94 - 109 mmol/L    Carbon Dioxide 27 20 - 32 mmol/L    Anion Gap 6 3 - 14 mmol/L    Glucose 125 (H) 70 - 99 mg/dL    Urea Nitrogen 18 7 - 30 mg/dL    Creatinine 1.01 0.66 - 1.25 mg/dL    GFR Estimate 79 >60 mL/min/[1.73_m2]    GFR Estimate If Black >90 >60 mL/min/[1.73_m2]    Calcium 9.0 8.5 - 10.1 mg/dL   CBC with platelets    Collection Time: 01/10/20  8:45 AM   Result Value Ref Range    WBC 5.3 4.0 - 11.0 10e9/L    RBC Count 4.67 4.4 - 5.9 10e12/L    Hemoglobin 14.3 13.3 - 17.7 g/dL    Hematocrit 43.5 40.0 - 53.0 %    MCV 93 78 - 100 fl    MCH 30.6 26.5 - 33.0 pg    MCHC 32.9 31.5 - 36.5 g/dL    RDW 13.2 10.0 - 15.0 %    Platelet Count 62 (L) 150 - 450 10e9/L      IMPRESSION:  Mr. Pretty is doing well.  His cirrhosis is very well compensated.  The main reason why we were considering transplantation was a concern about hepatopulmonary syndrome, although his room air pO2 is far too high to be listed for HPS.  It is really not clear whether he has HBS anyway.  All in all, his quality of life is  quite good.  All complications are currently well addressed.  He is up-to-date with regard to vaccines, although he should get a Prevnar 13 vaccine in about 9 months.  My plan will be to see the patient back in the clinic again in 3 months.      Thank you very much for allowing me to participate in the care of this patient.  If you have any questions regarding my recommendations, please do not hesitate to contact me.       Vinay Sanchez MD      Professor of Medicine  [employer]      Executive Medical Director, Solid Organ Transplant Program  [facility]       Again, thank you for allowing me to participate in the care of your patient.      Sincerely,    Vinay Sanchez MD

## 2020-01-10 NOTE — LETTER
[facility] HEPATOLOGY  [address]  [address]  [facility] [address]  [phone]          January 10, 2020    RE:  Charan Pretty                                                                                                                                                       [address] [facility] [address]            To whom it may concern:    Charan Pretty is under my professional care for cirrhosis. He  may return to work on February 10th, 2020 with the following restrictions:    When the patient returns to work, the following restrictions apply until March 31, 2020:  A) Bend: no restrictions  B) Squat: no restrictions  C) Walk/Stand: no restrictions  D) Reach Above Shoulders: no restrictions  E) Lift, carry, push, and pull no more than: 25 pounds.      Sincerely,        Vinay Sanchez MD

## 2020-01-10 NOTE — LETTER
"1/10/2020      RE: Charan Pretty  [address]  [facility] [address]       I had the pleasure of seeing Charan Pretty for followup in the Liver Clinic at the [facility] on 01/10/2020.  Mr. Pretty returns for followup after having undergone his pretransplant evaluation.  He is not listed for liver transplantation.  His MELD score remains quite low.  He really does not experience any significant complications.      He continues to do well.  He denies any abdominal pain, itching, skin rash or fatigue.  He denies any increased abdominal girth or lower extremity edema.  He denies any fevers or chills, cough or shortness of breath.  He denies any nausea or vomiting, diarrhea or constipation.  His appetite has been good and his weight has been stable.  There have been no other new events since he was last seen.     Current Outpatient Medications   Medication     Ferrous Gluconate 240 (27 Fe) MG TABS     furosemide (LASIX) 40 MG tablet     levothyroxine (SYNTHROID/LEVOTHROID) 25 MCG tablet     pantoprazole (PROTONIX) 40 MG EC tablet     potassium chloride ER (K-DUR/KLOR-CON M) 10 MEQ CR tablet     Metoprolol Succinate (TOPROL XL PO)     order for DME     No current facility-administered medications for this visit.      Facility-Administered Medications Ordered in Other Visits   Medication     sodium chloride (PF) 0.9% PF flush 10 mL     sodium chloride bacteriostatic 0.9 % flush 10 mL     BP (!) 158/67   Pulse 73   Temp 98.2  F (36.8  C) (Oral)   Ht 1.664 m (5' 5.5\")   Wt 107.4 kg (236 lb 12.8 oz)   SpO2 95%   BMI 38.81 kg/m       EXAMINATION:  In general, he looks quite well.  HEENT exam shows no scleral icterus or temporal muscle wasting.  His chest is clear.  His abdominal exam shows no increase in girth.  No masses or tenderness to palpation are present.  His liver is 10 cm in span without left lobe enlargement.  No spleen tip is palpable and extremity exam shows " no edema.  Skin exam shows no stigmata of chronic liver disease.  Neurologic exam shows no asterixis.     Recent Results (from the past 168 hour(s))   INR    Collection Time: 01/10/20  8:45 AM   Result Value Ref Range    INR 1.23 (H) 0.86 - 1.14   Hepatic panel    Collection Time: 01/10/20  8:45 AM   Result Value Ref Range    Bilirubin Direct 0.4 (H) 0.0 - 0.2 mg/dL    Bilirubin Total 1.6 (H) 0.2 - 1.3 mg/dL    Albumin 3.6 3.4 - 5.0 g/dL    Protein Total 7.8 6.8 - 8.8 g/dL    Alkaline Phosphatase 82 40 - 150 U/L    ALT 52 0 - 70 U/L    AST 42 0 - 45 U/L   Basic metabolic panel    Collection Time: 01/10/20  8:45 AM   Result Value Ref Range    Sodium 142 133 - 144 mmol/L    Potassium 4.0 3.4 - 5.3 mmol/L    Chloride 109 94 - 109 mmol/L    Carbon Dioxide 27 20 - 32 mmol/L    Anion Gap 6 3 - 14 mmol/L    Glucose 125 (H) 70 - 99 mg/dL    Urea Nitrogen 18 7 - 30 mg/dL    Creatinine 1.01 0.66 - 1.25 mg/dL    GFR Estimate 79 >60 mL/min/[1.73_m2]    GFR Estimate If Black >90 >60 mL/min/[1.73_m2]    Calcium 9.0 8.5 - 10.1 mg/dL   CBC with platelets    Collection Time: 01/10/20  8:45 AM   Result Value Ref Range    WBC 5.3 4.0 - 11.0 10e9/L    RBC Count 4.67 4.4 - 5.9 10e12/L    Hemoglobin 14.3 13.3 - 17.7 g/dL    Hematocrit 43.5 40.0 - 53.0 %    MCV 93 78 - 100 fl    MCH 30.6 26.5 - 33.0 pg    MCHC 32.9 31.5 - 36.5 g/dL    RDW 13.2 10.0 - 15.0 %    Platelet Count 62 (L) 150 - 450 10e9/L      IMPRESSION:  Mr. Pretty is doing well.  His cirrhosis is very well compensated.  The main reason why we were considering transplantation was a concern about hepatopulmonary syndrome, although his room air pO2 is far too high to be listed for HPS.  It is really not clear whether he has HBS anyway.  All in all, his quality of life is quite good.  All complications are currently well addressed.  He is up-to-date with regard to vaccines, although he should get a Prevnar 13 vaccine in about 9 months.  My plan will be to see the patient  back in the clinic again in 3 months.      Thank you very much for allowing me to participate in the care of this patient.  If you have any questions regarding my recommendations, please do not hesitate to contact me.       Vinay Sanchez MD      Professor of Medicine  [employer]      Executive Medical Director, Solid Organ Transplant Program  [facility]       Vinay Sanchez MD

## 2020-01-14 ENCOUNTER — COMMITTEE REVIEW (OUTPATIENT)
Dept: TRANSPLANT | Facility: CLINIC | Age: 65
End: 2020-01-14

## 2020-01-14 NOTE — LETTER
January 15, 2020    Charan Pretty  [address]  [facility] [address]    Dear Mr. Pretty,   The purpose of this letter is to let you know that on  the [facility] Multi-Disciplinary Selection Team reviewed the results of your transplant evaluation.  Based on the results of your evaluation and the selection criteria used by our program , the decision was made to not list you on the liver transplant list. This is because you are currently doing too well for transplant at this time, and hopefully you will not need one in the future either.   Important things you should know:    If you would like to discuss the decision, or if your medical status changes you may schedule a return visits with your doctor by calling [phone] and asking to speak to your transplant coordinator.    We recommend that you continue to follow up with your primary care doctor and either Dr. Sanchez or Florian Mai,DIAMANTE at [facility] in order to manage your health concerns. They will also be able to re-refer you for transplant if the needed in the future.   Enclosed is a letter from [employer] which describes the services offered to patients by [facility] and the [facility] Network.  Thank you for allowing us to participate in your care.  We wish you well.  Sincerely,    Jacinto Khan Jr., BSN, RN  Liver Transplant Coordinator  [phone]    Solid Organ Transplant  [facility]    Enclosures: [employer] Letter  cc: Care Team

## 2020-01-14 NOTE — COMMITTEE REVIEW
Abdominal Committee Review Note     Evaluation Date: 10/28/2019  Committee Review Date: 1/14/2020    Organ being evaluated for: Liver    Transplant Phase: Evaluation  Transplant Status: Active    Transplant Coordinator: Jacinto Khan Jr.  Transplant Surgeon:   Kush Ma    Referring Physician: Florian Mai    Primary Diagnosis: Cirrhosis: Fatty Liver ([place])  Secondary Diagnosis:     Committee Review Members:  Nurse Narayan Boss, LPN   Nutrition Traci Fabian, [place]   Pharmacy Alexandro Roa, [facility]    - Clinical Ranulfo Licona, DARIA, Janette Oneil, [facility]   Transplant Marleny Davison, WILIAN, Vinay Sanchez MD, Jr Jacinto Khan, WILIAN, Rena Keys, APRN CNP, Constanza Linda, RN, Alvaro Hutchins MD, Bridgett Fritz, WILIAN, Kenji Rojas MD, Kush Ma MD, Thomas M. Leventhal, MD, Orlando Nazario MD, Fernando Kline MD       Transplant Eligibility: Cirrhosis with MELD    Committee Review Decision: Declined    Relative Contraindications: Other, too well for transplant at this time    Absolute Contraindications: None    Committee Chair Vinay Sanchez MD verbally attested to the committee's decision.    Committee Discussion Details: too well for transplant    Close evaluation as patient MELD score and hepatic function does not warrant a liver txp at this time.

## 2020-01-14 NOTE — PROGRESS NOTES
"I had the pleasure of seeing Charan Pretty for followup in the Liver Clinic at the [facility] on 01/10/2020.  Mr. Pretty returns for followup after having undergone his pretransplant evaluation.  He is not listed for liver transplantation.  His MELD score remains quite low.  He really does not experience any significant complications.      He continues to do well.  He denies any abdominal pain, itching, skin rash or fatigue.  He denies any increased abdominal girth or lower extremity edema.  He denies any fevers or chills, cough or shortness of breath.  He denies any nausea or vomiting, diarrhea or constipation.  His appetite has been good and his weight has been stable.  There have been no other new events since he was last seen.     Current Outpatient Medications   Medication     Ferrous Gluconate 240 (27 Fe) MG TABS     furosemide (LASIX) 40 MG tablet     levothyroxine (SYNTHROID/LEVOTHROID) 25 MCG tablet     pantoprazole (PROTONIX) 40 MG EC tablet     potassium chloride ER (K-DUR/KLOR-CON M) 10 MEQ CR tablet     Metoprolol Succinate (TOPROL XL PO)     order for DME     No current facility-administered medications for this visit.      Facility-Administered Medications Ordered in Other Visits   Medication     sodium chloride (PF) 0.9% PF flush 10 mL     sodium chloride bacteriostatic 0.9 % flush 10 mL     BP (!) 158/67   Pulse 73   Temp 98.2  F (36.8  C) (Oral)   Ht 1.664 m (5' 5.5\")   Wt 107.4 kg (236 lb 12.8 oz)   SpO2 95%   BMI 38.81 kg/m      EXAMINATION:  In general, he looks quite well.  HEENT exam shows no scleral icterus or temporal muscle wasting.  His chest is clear.  His abdominal exam shows no increase in girth.  No masses or tenderness to palpation are present.  His liver is 10 cm in span without left lobe enlargement.  No spleen tip is palpable and extremity exam shows no edema.  Skin exam shows no stigmata of chronic liver disease.  Neurologic exam shows " no asterixis.     Recent Results (from the past 168 hour(s))   INR    Collection Time: 01/10/20  8:45 AM   Result Value Ref Range    INR 1.23 (H) 0.86 - 1.14   Hepatic panel    Collection Time: 01/10/20  8:45 AM   Result Value Ref Range    Bilirubin Direct 0.4 (H) 0.0 - 0.2 mg/dL    Bilirubin Total 1.6 (H) 0.2 - 1.3 mg/dL    Albumin 3.6 3.4 - 5.0 g/dL    Protein Total 7.8 6.8 - 8.8 g/dL    Alkaline Phosphatase 82 40 - 150 U/L    ALT 52 0 - 70 U/L    AST 42 0 - 45 U/L   Basic metabolic panel    Collection Time: 01/10/20  8:45 AM   Result Value Ref Range    Sodium 142 133 - 144 mmol/L    Potassium 4.0 3.4 - 5.3 mmol/L    Chloride 109 94 - 109 mmol/L    Carbon Dioxide 27 20 - 32 mmol/L    Anion Gap 6 3 - 14 mmol/L    Glucose 125 (H) 70 - 99 mg/dL    Urea Nitrogen 18 7 - 30 mg/dL    Creatinine 1.01 0.66 - 1.25 mg/dL    GFR Estimate 79 >60 mL/min/[1.73_m2]    GFR Estimate If Black >90 >60 mL/min/[1.73_m2]    Calcium 9.0 8.5 - 10.1 mg/dL   CBC with platelets    Collection Time: 01/10/20  8:45 AM   Result Value Ref Range    WBC 5.3 4.0 - 11.0 10e9/L    RBC Count 4.67 4.4 - 5.9 10e12/L    Hemoglobin 14.3 13.3 - 17.7 g/dL    Hematocrit 43.5 40.0 - 53.0 %    MCV 93 78 - 100 fl    MCH 30.6 26.5 - 33.0 pg    MCHC 32.9 31.5 - 36.5 g/dL    RDW 13.2 10.0 - 15.0 %    Platelet Count 62 (L) 150 - 450 10e9/L      IMPRESSION:  Mr. Pretty is doing well.  His cirrhosis is very well compensated.  The main reason why we were considering transplantation was a concern about hepatopulmonary syndrome, although his room air pO2 is far too high to be listed for HPS.  It is really not clear whether he has HBS anyway.  All in all, his quality of life is quite good.  All complications are currently well addressed.  He is up-to-date with regard to vaccines, although he should get a Prevnar 13 vaccine in about 9 months.  My plan will be to see the patient back in the clinic again in 3 months.      Thank you very much for allowing me to  participate in the care of this patient.  If you have any questions regarding my recommendations, please do not hesitate to contact me.       Vinay Sanchez MD      Professor of Medicine  [employer]      Executive Medical Director, Solid Organ Transplant Program  [facility]

## 2020-02-25 ENCOUNTER — TELEPHONE (OUTPATIENT)
Dept: GASTROENTEROLOGY | Facility: CLINIC | Age: 65
End: 2020-02-25

## 2020-02-25 NOTE — TELEPHONE ENCOUNTER
Pt returned writers call. Return to work letter with no restrictions faxed to [facility] at [phone] as requested by patient.    Nyla Bolanos LPN  Hepatology Clinic    -------  LVM letting pt know Dr. Sanchez was fine with removing the lifting restriction. Asked that patient call writer back if needing any documentation.    Nyla Bolanos LPN  Hepatology Clinic          ----- Message from Jacinto Khan Jr., RN sent at 2/24/2020  1:02 PM CST -----  Regarding: FW: lifting restriction???  Good morning     Forgot we took him out of evaluation a month ago.    Can you let him know Dr. Sanchez will remove and lifting restriction and get him whatever info he needs on that?    Thank you, tk      ----- Message -----  From: Vinay Sanchez MD  Sent: 2/22/2020   9:55 AM CST  To: Jacinto Khan Jr., RN  Subject: RE: lifting restriction???                       We can remove now.  ----- Message -----  From: Jacinto Khan Jr., RN  Sent: 2/21/2020  10:19 AM CST  To: Vinay Sanchez MD  Subject: lifting restriction???                           Good morning    Patient called just now.    Had EGD yesterday at [facility] with 4 bands placed. Getting repeat in 6 weeks.    He would like to go back to work 3/9/20, but has a weight restriction of 25 lbs from you (he says)    Do you want him to wait for all clear EGD in 6 weeks of can we remove the weight restriction?     Have a good day, tk    Jacinto Khan Jr., BSN, RN  Liver Transplant Coordinator  [phone]

## 2020-02-25 NOTE — LETTER
February 26, 2020          RE: Charan Pretty  [address]  [facility] [address]       To whom it may concern:    Charan Pretty may return to work with the following: No restrictions on 3/9/2020.  Please call [phone] option 3 if you have any questions.    Sincerely,    .

## 2020-05-19 ENCOUNTER — TELEPHONE (OUTPATIENT)
Dept: GASTROENTEROLOGY | Facility: CLINIC | Age: 65
End: 2020-05-19

## (undated) DEVICE — CATH ANGIO INFINITI JL4 4FRX100CM 538420

## (undated) DEVICE — INTRO SHEATH AVANTI 4FRX23CM 504604T

## (undated) DEVICE — KIT HAND CONTROL ACIST 014644 AR-P54

## (undated) DEVICE — Device

## (undated) DEVICE — PACK HEART LEFT CUSTOM

## (undated) DEVICE — CATH ANGIO INFINITI 3DRC 4FRX100CM 538476

## (undated) DEVICE — INTRO SHEATH 7FRX25CM PINNACLE RSS706

## (undated) RX ORDER — FENTANYL CITRATE 50 UG/ML
INJECTION, SOLUTION INTRAMUSCULAR; INTRAVENOUS
Status: DISPENSED
Start: 2019-11-22

## (undated) RX ORDER — HEPARIN SODIUM 1000 [USP'U]/ML
INJECTION, SOLUTION INTRAVENOUS; SUBCUTANEOUS
Status: DISPENSED
Start: 2019-11-22

## (undated) RX ORDER — NITROGLYCERIN 5 MG/ML
VIAL (ML) INTRAVENOUS
Status: DISPENSED
Start: 2019-11-22

## (undated) RX ORDER — LIDOCAINE HYDROCHLORIDE 20 MG/ML
SOLUTION OROPHARYNGEAL
Status: DISPENSED
Start: 2019-10-10

## (undated) RX ORDER — SODIUM CHLORIDE 9 MG/ML
INJECTION, SOLUTION INTRAVENOUS
Status: DISPENSED
Start: 2019-11-22